# Patient Record
Sex: FEMALE
[De-identification: names, ages, dates, MRNs, and addresses within clinical notes are randomized per-mention and may not be internally consistent; named-entity substitution may affect disease eponyms.]

---

## 2020-11-14 ENCOUNTER — NURSE TRIAGE (OUTPATIENT)
Dept: OTHER | Facility: CLINIC | Age: 65
End: 2020-11-14

## 2020-11-14 NOTE — TELEPHONE ENCOUNTER
Reason for Disposition   [1] Fever > 101 F (38.3 C) AND [2] age > 61    Answer Assessment - Initial Assessment Questions  1. COVID-19 DIAGNOSIS: \"Who made your Coronavirus (COVID-19) diagnosis? \" \"Was it confirmed by a positive lab test?\" If not diagnosed by a HCP, ask \"Are there lots of cases (community spread) where you live? \" (See public health department website, if unsure)        [facility]    2. ONSET: \"When did the COVID-19 symptoms start? \"       Thursday    3. WORST SYMPTOM: \"What is your worst symptom? \" (e.g., cough, fever, shortness of breath, muscle aches)      Headache    4. COUGH: \"Do you have a cough? \" If so, ask: \"How bad is the cough? \"        Dry cough and intermittent    5. FEVER: \"Do you have a fever? \" If so, ask: \"What is your temperature, how was it measured, and when did it start? \"      101.2,     6. RESPIRATORY STATUS: \"Describe your breathing? \" (e.g., shortness of breath, wheezing, unable to speak)        None    7. BETTER-SAME-WORSE: Lena Cordial you getting better, staying the same or getting worse compared to yesterday? \"  If getting worse, ask, \"In what way? \"      Same    8. HIGH RISK DISEASE: \"Do you have any chronic medical problems? \" (e.g., asthma, heart or lung disease, weak immune system, etc.)      Hypertension, Obese    10. OTHER SYMPTOMS: \"Do you have any other symptoms? \"  (e.g., chills, fatigue, headache, loss of smell or taste, muscle pain, sore throat)        Headache, fatigue, muscle pain, chills, sore throat    Caller stated she is experiencing some symptoms of a fever of 101. Since Thursday caller developed a dry cough and aches. Caller said she was exposed to someone who tested positive. Caller was informed to follow up with her PCP and to call back for additional questions or if her symptoms worsened and agreed.     Protocols used: CORONAVIRUS (COVID-19) DIAGNOSED OR SUSPECTED-ADULT-

## 2023-03-31 ENCOUNTER — TELEPHONE (OUTPATIENT)
Dept: PRIMARY CARE | Facility: CLINIC | Age: 68
End: 2023-03-31

## 2023-03-31 NOTE — TELEPHONE ENCOUNTER
Pt is calling to discuss some stiches on her hand that she thinks need to come out. She is looking for advise Please call.

## 2023-04-01 PROBLEM — M25.562 KNEE PAIN, BILATERAL: Status: ACTIVE | Noted: 2023-04-01

## 2023-04-01 PROBLEM — M79.605 PAIN IN BOTH LOWER EXTREMITIES: Status: ACTIVE | Noted: 2023-04-01

## 2023-04-01 PROBLEM — M25.672 JOINT STIFFNESS OF BOTH ANKLES: Status: ACTIVE | Noted: 2023-04-01

## 2023-04-01 PROBLEM — M70.30 BURSITIS OF ELBOW: Status: ACTIVE | Noted: 2023-04-01

## 2023-04-01 PROBLEM — M25.571 BILATERAL ANKLE PAIN: Status: ACTIVE | Noted: 2023-04-01

## 2023-04-01 PROBLEM — M19.071 ARTHRITIS OF BOTH ANKLES: Status: ACTIVE | Noted: 2023-04-01

## 2023-04-01 PROBLEM — E03.9 HYPOTHYROIDISM, ADULT: Status: ACTIVE | Noted: 2023-04-01

## 2023-04-01 PROBLEM — U07.1 DISEASE DUE TO SEVERE ACUTE RESPIRATORY SYNDROME CORONAVIRUS 2 (SARS-COV-2): Status: ACTIVE | Noted: 2023-04-01

## 2023-04-01 PROBLEM — M79.604 PAIN IN BOTH LOWER EXTREMITIES: Status: ACTIVE | Noted: 2023-04-01

## 2023-04-01 PROBLEM — I89.0 LYMPHEDEMA OF BOTH LOWER EXTREMITIES: Status: ACTIVE | Noted: 2023-04-01

## 2023-04-01 PROBLEM — M25.561 KNEE PAIN, BILATERAL: Status: ACTIVE | Noted: 2023-04-01

## 2023-04-01 PROBLEM — M10.9 GOUT: Status: ACTIVE | Noted: 2023-04-01

## 2023-04-01 PROBLEM — M17.0 ARTHRITIS OF BOTH KNEES: Status: ACTIVE | Noted: 2023-04-01

## 2023-04-01 PROBLEM — M25.572 BILATERAL ANKLE PAIN: Status: ACTIVE | Noted: 2023-04-01

## 2023-04-01 PROBLEM — M25.671 JOINT STIFFNESS OF BOTH ANKLES: Status: ACTIVE | Noted: 2023-04-01

## 2023-04-01 PROBLEM — M62.81 GENERALIZED MUSCLE WEAKNESS: Status: ACTIVE | Noted: 2023-04-01

## 2023-04-01 PROBLEM — M19.072 ARTHRITIS OF BOTH ANKLES: Status: ACTIVE | Noted: 2023-04-01

## 2023-04-01 PROBLEM — E86.0 DEHYDRATION: Status: ACTIVE | Noted: 2023-04-01

## 2023-04-01 RX ORDER — DOCUSATE SODIUM 100 MG/1
100 CAPSULE, LIQUID FILLED ORAL DAILY
COMMUNITY

## 2023-04-01 RX ORDER — TRIAMCINOLONE ACETONIDE OINTMENT USP, 0.05% 0.5 MG/G
OINTMENT TOPICAL
COMMUNITY

## 2023-04-01 RX ORDER — FUROSEMIDE 40 MG/1
1 TABLET ORAL DAILY
COMMUNITY
End: 2023-11-02 | Stop reason: SDUPTHER

## 2023-04-01 RX ORDER — TAZAROTENE 1 MG/G
CREAM TOPICAL
COMMUNITY

## 2023-04-01 RX ORDER — LOSARTAN POTASSIUM AND HYDROCHLOROTHIAZIDE 12.5; 1 MG/1; MG/1
1 TABLET ORAL DAILY
COMMUNITY
End: 2023-11-02 | Stop reason: SDUPTHER

## 2023-04-01 RX ORDER — ASPIRIN 81 MG/1
TABLET ORAL
COMMUNITY

## 2023-04-01 RX ORDER — SIMVASTATIN 40 MG/1
1 TABLET, FILM COATED ORAL NIGHTLY
COMMUNITY
End: 2024-01-25 | Stop reason: SDUPTHER

## 2023-04-01 RX ORDER — LEVOTHYROXINE SODIUM 100 UG/1
1 TABLET ORAL DAILY
COMMUNITY
End: 2023-08-30

## 2023-04-01 RX ORDER — PENTOXIFYLLINE 400 MG/1
TABLET, EXTENDED RELEASE ORAL
COMMUNITY

## 2023-04-03 ENCOUNTER — OFFICE VISIT (OUTPATIENT)
Dept: PRIMARY CARE | Facility: CLINIC | Age: 68
End: 2023-04-03
Payer: COMMERCIAL

## 2023-04-03 VITALS
HEART RATE: 78 BPM | DIASTOLIC BLOOD PRESSURE: 84 MMHG | WEIGHT: 268 LBS | TEMPERATURE: 97.9 F | BODY MASS INDEX: 39.69 KG/M2 | HEIGHT: 69 IN | SYSTOLIC BLOOD PRESSURE: 136 MMHG | OXYGEN SATURATION: 98 % | RESPIRATION RATE: 16 BRPM

## 2023-04-03 DIAGNOSIS — S61.011D LACERATION OF RIGHT THUMB WITHOUT FOREIGN BODY WITHOUT DAMAGE TO NAIL, SUBSEQUENT ENCOUNTER: Primary | ICD-10-CM

## 2023-04-03 PROCEDURE — 1159F MED LIST DOCD IN RCRD: CPT | Performed by: FAMILY MEDICINE

## 2023-04-03 PROCEDURE — 1036F TOBACCO NON-USER: CPT | Performed by: FAMILY MEDICINE

## 2023-04-05 NOTE — PROGRESS NOTES
"Patient ID: Vielka Geiger is a 67 y.o. female.    ProceduresSubjective   Vielka Geiger is a 67 y.o. female who obtained a laceration 10 days ago, which required closure with 1 suture. Mechanism of injury: cut finger with knife . She denies pain, redness, or drainage from the wound. Her last tetanus was 10 days ago.    Objective   /84 (BP Location: Left arm)   Pulse 78   Temp 36.6 °C (97.9 °F)   Resp 16   Ht 1.74 m (5' 8.5\")   Wt 122 kg (268 lb)   SpO2 98%   BMI 40.16 kg/m²   Injury exam:  A 3 cm laceration noted on the thumb is healing well, without evidence of infection.    Assessment/Plan   Laceration is healing well, without evidence of infection.    1. 1 suture were removed.  2. Wound care discussed.  3. Follow up as needed.  "

## 2023-08-22 PROBLEM — E04.1 NONTOXIC UNINODULAR GOITER: Status: ACTIVE | Noted: 2023-08-22

## 2023-08-22 PROBLEM — M25.672 JOINT STIFFNESS OF BOTH ANKLES: Status: RESOLVED | Noted: 2023-04-01 | Resolved: 2023-08-22

## 2023-08-22 PROBLEM — M25.562 KNEE PAIN, BILATERAL: Status: RESOLVED | Noted: 2023-04-01 | Resolved: 2023-08-22

## 2023-08-22 PROBLEM — M25.671 JOINT STIFFNESS OF BOTH ANKLES: Status: RESOLVED | Noted: 2023-04-01 | Resolved: 2023-08-22

## 2023-08-22 PROBLEM — E86.0 DEHYDRATION: Status: RESOLVED | Noted: 2023-04-01 | Resolved: 2023-08-22

## 2023-08-22 PROBLEM — M79.605 PAIN IN BOTH LOWER EXTREMITIES: Status: RESOLVED | Noted: 2023-04-01 | Resolved: 2023-08-22

## 2023-08-22 PROBLEM — G51.0 BELL'S PALSY: Status: ACTIVE | Noted: 2023-08-22

## 2023-08-22 PROBLEM — I10 ESSENTIAL HYPERTENSION: Status: ACTIVE | Noted: 2023-08-22

## 2023-08-22 PROBLEM — E78.2 MIXED HYPERLIPIDEMIA: Status: ACTIVE | Noted: 2023-08-22

## 2023-08-22 PROBLEM — M25.561 KNEE PAIN, BILATERAL: Status: RESOLVED | Noted: 2023-04-01 | Resolved: 2023-08-22

## 2023-08-22 PROBLEM — M79.604 PAIN IN BOTH LOWER EXTREMITIES: Status: RESOLVED | Noted: 2023-04-01 | Resolved: 2023-08-22

## 2023-08-22 RX ORDER — NAFTIFINE HYDROCHLORIDE 20 MG/G
CREAM TOPICAL
COMMUNITY
Start: 2016-11-09

## 2023-08-22 RX ORDER — SODIUM SULFACETAMIDE AND SULFUR 80; 40 MG/ML; MG/ML
1 SOLUTION TOPICAL
COMMUNITY
Start: 2016-07-14

## 2023-08-22 RX ORDER — LOSARTAN POTASSIUM 100 MG/1
100 TABLET ORAL DAILY
COMMUNITY
End: 2023-12-11 | Stop reason: SDUPTHER

## 2023-08-22 RX ORDER — EFINACONAZOLE 100 MG/ML
SOLUTION TOPICAL
COMMUNITY
Start: 2016-05-05

## 2023-08-30 ENCOUNTER — APPOINTMENT (OUTPATIENT)
Dept: PRIMARY CARE | Facility: CLINIC | Age: 68
End: 2023-08-30
Payer: MEDICARE

## 2023-08-30 DIAGNOSIS — E04.1 NONTOXIC UNINODULAR GOITER: Primary | ICD-10-CM

## 2023-08-30 RX ORDER — LEVOTHYROXINE SODIUM 100 UG/1
100 TABLET ORAL DAILY
OUTPATIENT
Start: 2023-08-30

## 2023-08-30 RX ORDER — LEVOTHYROXINE SODIUM 100 UG/1
100 TABLET ORAL
Qty: 90 TABLET | Refills: 3 | Status: SHIPPED | OUTPATIENT
Start: 2023-08-30 | End: 2023-08-31 | Stop reason: SDUPTHER

## 2023-08-30 RX ORDER — LEVOTHYROXINE SODIUM 100 UG/1
100 TABLET ORAL
COMMUNITY
End: 2023-08-30 | Stop reason: SDUPTHER

## 2023-08-30 NOTE — TELEPHONE ENCOUNTER
Per pt, Synthroid 100 mcg no longer will be covered by her insurance unless it's coded as GILMAR 5. Please send to new mail in - [facility].

## 2023-08-31 DIAGNOSIS — E04.1 NONTOXIC UNINODULAR GOITER: ICD-10-CM

## 2023-08-31 RX ORDER — LEVOTHYROXINE SODIUM 100 UG/1
100 TABLET ORAL
Qty: 90 TABLET | Refills: 3 | Status: SHIPPED | OUTPATIENT
Start: 2023-08-31 | End: 2023-09-08 | Stop reason: SDUPTHER

## 2023-09-07 DIAGNOSIS — E04.1 NONTOXIC UNINODULAR GOITER: ICD-10-CM

## 2023-09-07 NOTE — TELEPHONE ENCOUNTER
Per pt, she found a new program that sends Synthroid directly from the . [facility] will be calling or faxing us a form in the next several days.

## 2023-09-07 NOTE — TELEPHONE ENCOUNTER
Per pt, please fax Synthroid 100 mcg 90 tablets, 90 days script to [facility] mail order  Fax [phone]   Tel [phone]

## 2023-09-10 RX ORDER — LEVOTHYROXINE SODIUM 100 UG/1
100 TABLET ORAL
Qty: 90 TABLET | Refills: 3 | Status: SHIPPED | OUTPATIENT
Start: 2023-09-10

## 2023-10-02 ENCOUNTER — TREATMENT (OUTPATIENT)
Dept: PHYSICAL THERAPY | Facility: CLINIC | Age: 68
End: 2023-10-02
Payer: MEDICARE

## 2023-10-02 DIAGNOSIS — M25.572 BILATERAL ANKLE PAIN, UNSPECIFIED CHRONICITY: Primary | ICD-10-CM

## 2023-10-02 DIAGNOSIS — M25.571 BILATERAL ANKLE PAIN, UNSPECIFIED CHRONICITY: Primary | ICD-10-CM

## 2023-10-02 DIAGNOSIS — M25.561 KNEE PAIN, BILATERAL: ICD-10-CM

## 2023-10-02 DIAGNOSIS — M19.072 ARTHRITIS OF BOTH ANKLES: ICD-10-CM

## 2023-10-02 DIAGNOSIS — M17.0 ARTHRITIS OF BOTH KNEES: ICD-10-CM

## 2023-10-02 DIAGNOSIS — M19.071 ARTHRITIS OF BOTH ANKLES: ICD-10-CM

## 2023-10-02 DIAGNOSIS — M25.562 KNEE PAIN, BILATERAL: ICD-10-CM

## 2023-10-02 PROCEDURE — 97140 MANUAL THERAPY 1/> REGIONS: CPT | Mod: GP

## 2023-10-02 PROCEDURE — 97110 THERAPEUTIC EXERCISES: CPT | Mod: GP

## 2023-10-02 PROCEDURE — 97535 SELF CARE MNGMENT TRAINING: CPT | Mod: GP

## 2023-10-02 ASSESSMENT — ENCOUNTER SYMPTOMS
DEPRESSION: 0
LOSS OF SENSATION IN FEET: 0
OCCASIONAL FEELINGS OF UNSTEADINESS: 1

## 2023-10-02 NOTE — PROGRESS NOTES
Physical Therapy    Physical Therapy Treatment    Patient Name: Vielka Geiger  MRN: [id number]  Today's Date: 10/2/2023  Time Calculation  Start Time: 1050  Stop Time: 1145  Time Calculation (min): 55 min      Assessment:   Pt continues to tolerate tx well & is reporting decreased LE pain afterwards.  Her low back has been a little more aggravated, but this is likely just adjusting to the newer activity levels because the LEs are not as limiting & should begin to settle as her back tolerates more activity.  Overall the pt still has limited activity tolerance d/t back, & B LE pain, but anticipate pt. will see continued progress with regular HEP adherence.  However, she would still benefit from regular f/u's to monitor their progress & ensure appropriate progressions.     Plan:  OP PT Plan  Treatment/Interventions: Dry needling, Education/ Instruction, Electrical stimulation, Gait training, Manual therapy, Neuromuscular re-education, Self care/ home management, Therapeutic activities, Therapeutic exercises  PT Plan: Skilled PT  PT Frequency: 1 time per week  Certification Period Start Date: 09/28/23  Certification Period End Date: 12/28/23  Number of Treatments Authorized: 8  Rehab Potential: Good  Plan of Care Agreement: Patient    Current Problem  1. Bilateral ankle pain, unspecified chronicity  Follow Up In Physical Therapy      2. Knee pain, bilateral  Follow Up In Physical Therapy      3. Arthritis of both ankles  Follow Up In Physical Therapy      4. Arthritis of both knees  Follow Up In Physical Therapy          Subjective   Pt reports her hips are feeling pretty good & her knees are a little sore, but her back is a little more aggravated.  Pt reports since the hips & LEs are feeling better this can cause the back to be a little more aggravated which likely contributed.      Objective     Observations: pt. arrived to therapy ambulating IND but with decreased stance time on the L LE. Pt. was wearing B  "compression stockings (20-30 mmHg). Mod. edema, skin discoloration, & hardening noted B (R > L). Hypertonicity & tenderness also noted in the gastroc/soleus, peroneal, ant./post tibialis mm. Pt. also c/o increased pain along the B (R > L) malleoli (lateral > medial). Pt. c/o her 'familiar' L knee pain along the ITB, most notably at the distal insertion.      Manual Therapy ([id number]): 24 minutes  Soft tissue mobilization: superficial effleurage, petrissage, cross-friction, & mod. pressure to the L TFL, gluteals, quadriceps, & ITB, & B gastroc/soleus mm.  Active mm. pumping to the L quadriceps mm.  Joint mobs: supine B TC distraction (grade IV)    Appropriate force, direction, amplitude, & velocity for selected techniques to improve joint & soft tissue mobility & enhance ADL performance. Rationale & procedure given for above treatment techniques, & verbal consent was obtained prior to initiating treatment.       Dry Needling ([id number]): 6  minutes  Location (Needle length x Dosage):  - B gluteus medius (3\" x 4)  Type: basic insertion with needles left in situ + e-stim for mm. relaxation in B glute med   Response: no adverse events observed or reported    Pt. educated on theory & practical application of dry needling, as well as potential risks & benefits of needling, & additional verbal consent obtained prior to initiation of needling. Standard precautions, knowledge of appropriate benefits of treatment, & exclusion of relevant contraindications utilized.        Therapeutic Exercises ([id number]): 13 minutes   NuStep (level 7) x 12 min    Verbally reviewed but not actively performed today:   Walking x 200'  B LE calf raises  Walking program  Standing gastroc & soleus stretch  Hook-lying hip ABD iso's into belt  Hook-lying bridge  Firm / SLS / Hip ABD (each)    * = added to HEP. Sheet with exercise descriptions and images issued. Skilled intervention utilized in the appropriate selection & application of above exercises. Verbal " and tactile cues provided for proper form and technique. Pt. demonstrated appropriate form & verbalized understanding of optimal technique for above exercises.     TA = transverse abdominis activation  PPT = posterior pelvic tilt      Self Care / Home Management ([id number]): 12 minutes     Reviewed & updated current HEP:  B LE calf raises  Standing gastroc & soleus stretch  Firm / SLS / Hip ABD (each)  Hook-lying hip ABD iso's into belt  Walking program    Large portion of pt. care time devoted to reviewing pt's current symptoms & determining optimal manual techniques with appropriate HEP modifications. Reviewed importance of regular HEP adherence, & stressed importance of proper form. Reviewed differences between post-exercises soreness vs. increased 'familiar’ pain, & reviewed appropriate progressions/regressions with exercises/activities based on symptoms. Pt. verbalized understanding & agreement.

## 2023-10-11 ENCOUNTER — TREATMENT (OUTPATIENT)
Dept: PHYSICAL THERAPY | Facility: CLINIC | Age: 68
End: 2023-10-11
Payer: MEDICARE

## 2023-10-11 DIAGNOSIS — M25.571 CHRONIC PAIN OF BOTH ANKLES: ICD-10-CM

## 2023-10-11 DIAGNOSIS — M25.572 CHRONIC PAIN OF BOTH ANKLES: ICD-10-CM

## 2023-10-11 DIAGNOSIS — G89.29 CHRONIC PAIN OF BOTH ANKLES: ICD-10-CM

## 2023-10-11 DIAGNOSIS — M25.562 CHRONIC PAIN OF BOTH KNEES: Primary | ICD-10-CM

## 2023-10-11 DIAGNOSIS — M19.072 ARTHRITIS OF BOTH ANKLES: ICD-10-CM

## 2023-10-11 DIAGNOSIS — G89.29 CHRONIC PAIN OF BOTH KNEES: Primary | ICD-10-CM

## 2023-10-11 DIAGNOSIS — M25.561 CHRONIC PAIN OF BOTH KNEES: Primary | ICD-10-CM

## 2023-10-11 DIAGNOSIS — M17.0 ARTHRITIS OF BOTH KNEES: ICD-10-CM

## 2023-10-11 DIAGNOSIS — M19.071 ARTHRITIS OF BOTH ANKLES: ICD-10-CM

## 2023-10-11 PROCEDURE — 97140 MANUAL THERAPY 1/> REGIONS: CPT | Mod: GP

## 2023-10-11 PROCEDURE — 97110 THERAPEUTIC EXERCISES: CPT | Mod: GP

## 2023-10-11 PROCEDURE — 97535 SELF CARE MNGMENT TRAINING: CPT | Mod: GP

## 2023-10-11 ASSESSMENT — ENCOUNTER SYMPTOMS
DEPRESSION: 0
OCCASIONAL FEELINGS OF UNSTEADINESS: 1
LOSS OF SENSATION IN FEET: 0

## 2023-10-11 ASSESSMENT — PATIENT HEALTH QUESTIONNAIRE - PHQ9
1. LITTLE INTEREST OR PLEASURE IN DOING THINGS: NOT AT ALL
2. FEELING DOWN, DEPRESSED OR HOPELESS: NOT AT ALL
SUM OF ALL RESPONSES TO PHQ9 QUESTIONS 1 AND 2: 0

## 2023-10-11 NOTE — PROGRESS NOTES
Physical Therapy    Physical Therapy Treatment    Patient Name: Vielka Geiger  MRN: [id number]  Today's Date: 10/11/2023  Time Calculation  Start Time: 1403  Insurance   Visit number: 12 (2 of 8)   30 vs/yr   Medicare Certification Period: Beginnin2023 Endin2023      Assessment:  Pt continues to see slow, but steady improvements in her B LE pain.  She still fatigues quickly & reports feeling less confident on her feet when tired, but anticipate these will continue to improve with regular HEP adherence & a progressive return to activity.  However, she would still benefit from regular f/u's to monitor their progress & ensure appropriate progressions.     Plan:  OP PT Plan  Treatment/Interventions: Dry needling, Education/ Instruction, Electrical stimulation, Gait training, Manual therapy, Neuromuscular re-education, Self care/ home management, Therapeutic activities, Therapeutic exercises  PT Plan: Skilled PT  PT Frequency: 1 time per week  Certification Period Start Date: 23  Certification Period End Date: 23  Number of Treatments Authorized: 8  Rehab Potential: Good  Plan of Care Agreement: Patient    Current Problem  1. Chronic pain of both knees        2. Chronic pain of both ankles        3. Arthritis of both knees        4. Arthritis of both ankles              Subjective   Pt reports the DN really helped when going to see the jeff - states the car ride wasn't as bad & she was much more mobile getting up/down, etc.  Pt states her L knee got a little more aggravated over the past few days because she was 'pushing it a lot'.  Currently she reports the ankles still aren't as strong as she wants them to be & by mid day they feel 'weak'.  The low back also still hurts if she's on her feet or sitting for too long.       Objective     Observations:  pt. arrived to therapy ambulating IND but with decreased stance time on the L LE. Pt. was wearing B compression stockings (20-30  "mmHg). Mod. edema, skin discoloration, & hardening noted B (R > L). Hypertonicity & tenderness also noted in the gastroc/soleus, peroneal, ant./post tibialis mm. Pt. also c/o increased pain along the B (R > L) malleoli (lateral > medial). Pt. c/o her 'familiar' L knee pain along the ITB, most notably at the distal insertion.      Manual Therapy ([id number]):  15 minutes  Soft tissue mobilization: superficial effleurage, petrissage, cross-friction, & mod. pressure to the L TFL, gluteals, quadriceps, & ITB, & B gastroc/soleus mm.  Active mm. pumping to the L quadriceps mm.  Joint mobs: supine B TC distraction (grade IV)    Appropriate force, direction, amplitude, & velocity for selected techniques to improve joint & soft tissue mobility & enhance ADL performance. Rationale & procedure given for above treatment techniques, & verbal consent was obtained prior to initiating treatment.       Dry Needling ([id number]):  6 minutes  Location (Needle length x Dosage):  - B gluteus medius (3\" x 4)  - L ITB (2\" x 3)  Type: basic insertion with needles left in situ + e-stim for mm. relaxation in B glute med   Response: no adverse events observed or reported    Pt. educated on theory & practical application of dry needling, as well as potential risks & benefits of needling, & additional verbal consent obtained prior to initiation of needling. Standard precautions, knowledge of appropriate benefits of treatment, & exclusion of relevant contraindications utilized.        Therapeutic Exercises ([id number]):  14 minutes   NuStep (level 5) x 12:30 min    Verbally reviewed but not actively performed today:   Walking x 200'  B LE calf raises  Walking program  Standing gastroc & soleus stretch  Hook-lying hip ABD iso's into belt  Hook-lying bridge  Firm / SLS / Hip ABD (each)    * = added to HEP. Sheet with exercise descriptions and images issued. Skilled intervention utilized in the appropriate selection & application of above exercises. Verbal and " tactile cues provided for proper form and technique. Pt. demonstrated appropriate form & verbalized understanding of optimal technique for above exercises.     TA = transverse abdominis activation  PPT = posterior pelvic tilt      Self Care / Home Management ([id number]): 14 minutes     Reviewed & updated current HEP:  B LE calf raises  Standing gastroc & soleus stretch  Firm / SLS / Hip ABD (each)  Hook-lying hip ABD iso's into belt  Walking program    Large portion of pt. care time devoted to reviewing pt's current symptoms & determining optimal manual techniques with appropriate HEP modifications. Reviewed importance of regular HEP adherence, & stressed importance of proper form. Reviewed differences between post-exercises soreness vs. increased 'familiar’ pain, & reviewed appropriate progressions/regressions with exercises/activities based on symptoms. Pt. verbalized understanding & agreement.

## 2023-10-18 ENCOUNTER — APPOINTMENT (OUTPATIENT)
Dept: PHYSICAL THERAPY | Facility: CLINIC | Age: 68
End: 2023-10-18
Payer: MEDICARE

## 2023-10-25 ENCOUNTER — TREATMENT (OUTPATIENT)
Dept: PHYSICAL THERAPY | Facility: CLINIC | Age: 68
End: 2023-10-25
Payer: MEDICARE

## 2023-10-25 DIAGNOSIS — M17.0 ARTHRITIS OF BOTH KNEES: ICD-10-CM

## 2023-10-25 DIAGNOSIS — M19.071 ARTHRITIS OF BOTH ANKLES: ICD-10-CM

## 2023-10-25 DIAGNOSIS — G89.29 CHRONIC PAIN OF BOTH KNEES: Primary | ICD-10-CM

## 2023-10-25 DIAGNOSIS — M25.571 CHRONIC PAIN OF BOTH ANKLES: ICD-10-CM

## 2023-10-25 DIAGNOSIS — G89.29 CHRONIC PAIN OF BOTH ANKLES: ICD-10-CM

## 2023-10-25 DIAGNOSIS — M25.562 CHRONIC PAIN OF BOTH KNEES: Primary | ICD-10-CM

## 2023-10-25 DIAGNOSIS — M25.561 CHRONIC PAIN OF BOTH KNEES: Primary | ICD-10-CM

## 2023-10-25 DIAGNOSIS — M19.072 ARTHRITIS OF BOTH ANKLES: ICD-10-CM

## 2023-10-25 DIAGNOSIS — M25.572 CHRONIC PAIN OF BOTH ANKLES: ICD-10-CM

## 2023-10-25 PROCEDURE — 97140 MANUAL THERAPY 1/> REGIONS: CPT | Mod: GP

## 2023-10-25 NOTE — PROGRESS NOTES
Physical Therapy    Physical Therapy Treatment    Patient Name: Vielka Geiger  MRN: [id number]  Today's Date: 10/25/2023  Time Calculation  Start Time: 1409    Insurance   Visit number: 13 (3 of 8)   30 vs/yr   Medicare Certification Period: Beginnin2023 Endin2023      Assessment:  Pt progress has been slow, likely d/t excessive activity coupled with managing family stress.  Anticipate pt. will see improved progress with regular HEP adherence, but they would still benefit from regular f/u's to monitor their progress & ensure appropriate progressions.     Plan:  OP PT Plan  Treatment/Interventions: Dry needling, Education/ Instruction, Electrical stimulation, Gait training, Manual therapy, Neuromuscular re-education, Self care/ home management, Therapeutic activities, Therapeutic exercises  PT Plan: Skilled PT  PT Frequency: 1 time per week  Certification Period Start Date: 23  Certification Period End Date: 23  Number of Treatments Authorized: 8  Rehab Potential: Good  Plan of Care Agreement: Patient    Current Problem  1. Chronic pain of both knees        2. Chronic pain of both ankles        3. Arthritis of both knees        4. Arthritis of both ankles              Subjective   Pt reports she's having a lot of stress dealing with family medical conditions & work-related stress.   Pt reports she had to cancel her last appt because she wasn't feeling well, but also reported her B feet were having significantly increased pain.  Pt denies any changes in footwear, activity, or other potential cause of the increased pain.  States she has so much pain she had to work from home because she was having so much pain with WB.  States her foot symptoms have generally returned to baseline since.  Her hips had been feeling a little worse prior to the foot pain, but since then she's been having more B hip & back pain d/t compensating for her feet.      Objective     Observations:  pt. arrived  "to therapy ambulating IND but with decreased stance time on the L LE. Pt. was wearing B compression stockings (20-30 mmHg). Mod. edema, skin discoloration, & hardening noted B (R > L). Hypertonicity & tenderness also noted in the gastroc/soleus, peroneal, ant./post tibialis mm. Pt. also c/o increased pain along the B (R > L) malleoli (lateral > medial). Pt. c/o her 'familiar' L knee pain along the ITB, most notably at the distal insertion.      Manual Therapy ([id number]):  24 minutes  Soft tissue mobilization: superficial effleurage, petrissage, cross-friction, & mod. pressure to the L TFL, gluteals, quadriceps, & ITB, & B gastroc/soleus mm.  Active mm. pumping to the L quadriceps mm.  Joint mobs: supine B TC distraction (grade IV)    Appropriate force, direction, amplitude, & velocity for selected techniques to improve joint & soft tissue mobility & enhance ADL performance. Rationale & procedure given for above treatment techniques, & verbal consent was obtained prior to initiating treatment.       Dry Needling (27034):  6 minutes  Location (Needle length x Dosage):  - B gluteus medius (3\" x 4)  - L ITB (2\" x 3)  Type: basic insertion with needles left in situ + e-stim for mm. relaxation in B glute med   Response: no adverse events observed or reported    Pt. educated on theory & practical application of dry needling, as well as potential risks & benefits of needling, & additional verbal consent obtained prior to initiation of needling. Standard precautions, knowledge of appropriate benefits of treatment, & exclusion of relevant contraindications utilized.        Therapeutic Exercises ([id number]):  not performed today  NuStep (level 5) x 12:30 min    Verbally reviewed but not actively performed today:   Walking x 200'  B LE calf raises  Walking program  Standing gastroc & soleus stretch  Hook-lying hip ABD iso's into belt  Hook-lying bridge  Firm / SLS / Hip ABD (each)    * = added to HEP. Sheet with exercise descriptions " and images issued. Skilled intervention utilized in the appropriate selection & application of above exercises. Verbal and tactile cues provided for proper form and technique. Pt. demonstrated appropriate form & verbalized understanding of optimal technique for above exercises.     TA = transverse abdominis activation  PPT = posterior pelvic tilt      Self Care / Home Management ([id number]): not performed today    Reviewed & updated current HEP:  B LE calf raises  Standing gastroc & soleus stretch  Firm / SLS / Hip ABD (each)  Hook-lying hip ABD iso's into belt  Walking program    Large portion of pt. care time devoted to reviewing pt's current symptoms & determining optimal manual techniques with appropriate HEP modifications. Reviewed importance of regular HEP adherence, & stressed importance of proper form. Reviewed differences between post-exercises soreness vs. increased 'familiar’ pain, & reviewed appropriate progressions/regressions with exercises/activities based on symptoms. Discussed potential to attempt some aquatic therapy, but pt is resistant d/t vasomotor rhinitis.  Also discussed potential to obtain pedometer (i.e. FitBit, Apple watch, etc.) to better monitor total activity throughout the day.  Pt. verbalized understanding & agreement.

## 2023-11-01 ENCOUNTER — TREATMENT (OUTPATIENT)
Dept: PHYSICAL THERAPY | Facility: CLINIC | Age: 68
End: 2023-11-01
Payer: MEDICARE

## 2023-11-01 DIAGNOSIS — G89.29 CHRONIC PAIN OF BOTH ANKLES: ICD-10-CM

## 2023-11-01 DIAGNOSIS — M19.071 ARTHRITIS OF BOTH ANKLES: ICD-10-CM

## 2023-11-01 DIAGNOSIS — M17.0 ARTHRITIS OF BOTH KNEES: ICD-10-CM

## 2023-11-01 DIAGNOSIS — M19.072 ARTHRITIS OF BOTH ANKLES: ICD-10-CM

## 2023-11-01 DIAGNOSIS — M25.561 CHRONIC PAIN OF BOTH KNEES: Primary | ICD-10-CM

## 2023-11-01 DIAGNOSIS — M25.562 CHRONIC PAIN OF BOTH KNEES: Primary | ICD-10-CM

## 2023-11-01 DIAGNOSIS — G89.29 CHRONIC PAIN OF BOTH KNEES: Primary | ICD-10-CM

## 2023-11-01 DIAGNOSIS — M25.572 CHRONIC PAIN OF BOTH ANKLES: ICD-10-CM

## 2023-11-01 DIAGNOSIS — M25.571 CHRONIC PAIN OF BOTH ANKLES: ICD-10-CM

## 2023-11-01 PROCEDURE — 97140 MANUAL THERAPY 1/> REGIONS: CPT | Mod: GP

## 2023-11-01 PROCEDURE — 97110 THERAPEUTIC EXERCISES: CPT | Mod: GP

## 2023-11-01 PROCEDURE — 97535 SELF CARE MNGMENT TRAINING: CPT | Mod: GP

## 2023-11-01 NOTE — PROGRESS NOTES
Physical Therapy    Physical Therapy Treatment    Patient Name: Vielka Geiger  MRN: [id number]  Today's Date: 2023  Time Calculation  Start Time: 1402    Insurance   Visit number: 14 (4 of 8)   30 vs/yr   Medicare Certification Period: Beginnin2023 Endin2023      Assessment:  Pt's progress continues to be slow as she continues to deal with multiple family stressors.  Pt had not been completing her ankle stretches as prescribed which likely contributed her ankles feeling like they're locking up.  She tolerated tx well & reported decreased pain afterwards.  Anticipate pt. will see improved progress with regular HEP adherence, but they would still benefit from regular f/u's to monitor their progress & ensure appropriate progressions.     Plan:  OP PT Plan  Treatment/Interventions: Dry needling, Education/ Instruction, Electrical stimulation, Gait training, Manual therapy, Neuromuscular re-education, Self care/ home management, Therapeutic activities, Therapeutic exercises  PT Plan: Skilled PT  PT Frequency: 1 time per week  Certification Period Start Date: 23  Certification Period End Date: 23  Number of Treatments Authorized: 8  Rehab Potential: Good  Plan of Care Agreement: Patient      Current Problem  1. Chronic pain of both knees        2. Chronic pain of both ankles        3. Arthritis of both knees        4. Arthritis of both ankles              Subjective   Pt reports her pain has been better & has been trying to walk more.  She hasn't been able to utilize her FitBit because she needs a new one, but is planning to obtain one.  She's had some issues with taking care of her family which has caused some stress.  She still c/o her ankle 'giving out' with walking because they feel weak.        Objective     Observations:  pt. arrived to therapy ambulating IND but with decreased stance time on the L LE. Pt. was wearing B compression stockings (20-30 mmHg). Mod. edema, skin  "discoloration, & hardening noted B (R > L). Hypertonicity & tenderness also noted in the gastroc/soleus, peroneal, ant./post tibialis mm. Pt. also c/o increased pain along the B (R > L) malleoli (lateral > medial). Pt. c/o her 'familiar' L knee pain along the ITB, most notably at the distal insertion.      Manual Therapy ([id number]):  17 minutes  Soft tissue mobilization: superficial effleurage, petrissage, cross-friction, & mod. pressure to the L TFL, gluteals, quadriceps, & ITB, & B gastroc/soleus mm.  Active mm. pumping to the L quadriceps mm.  Joint mobs: supine B TC distraction (grade IV)    Appropriate force, direction, amplitude, & velocity for selected techniques to improve joint & soft tissue mobility & enhance ADL performance. Rationale & procedure given for above treatment techniques, & verbal consent was obtained prior to initiating treatment.       Dry Needling ([id number]):  6 minutes  Location (Needle length x Dosage):  - B gluteus medius (3\" x 4)  - L ITB (2\" x 3)  Type: basic insertion with needles left in situ + e-stim for mm. relaxation in B glute med   Response: no adverse events observed or reported    Pt. educated on theory & practical application of dry needling, as well as potential risks & benefits of needling, & additional verbal consent obtained prior to initiation of needling. Standard precautions, knowledge of appropriate benefits of treatment, & exclusion of relevant contraindications utilized.        Therapeutic Exercises ([id number]):  14 minutes  NuStep (level 6) x 12 min    Verbally reviewed but not actively performed today:   Walking x 200'  B LE calf raises  Walking program  Standing gastroc & soleus stretch  Hook-lying hip ABD iso's into belt  Hook-lying bridge  Firm / SLS / Hip ABD (each)    * = added to HEP. Sheet with exercise descriptions and images issued. Skilled intervention utilized in the appropriate selection & application of above exercises. Verbal and tactile cues provided for " proper form and technique. Pt. demonstrated appropriate form & verbalized understanding of optimal technique for above exercises.     TA = transverse abdominis activation  PPT = posterior pelvic tilt      Self Care / Home Management (35317): 16 minutes    Reviewed & updated current HEP:  B LE calf raises  Standing gastroc & soleus stretch  Firm / SLS / Hip ABD (each)  Hook-lying hip ABD iso's into belt  Walking program    Large portion of pt. care time devoted to reviewing pt's current symptoms & determining optimal manual techniques with appropriate HEP modifications. Reviewed importance of regular HEP adherence, & stressed importance of proper form. Reviewed differences between post-exercises soreness vs. increased 'familiar’ pain, & reviewed appropriate progressions/regressions with exercises/activities based on symptoms. Discussed potential to attempt some aquatic therapy, but pt is resistant d/t vasomotor rhinitis.  Also discussed potential to obtain pedometer (i.e. FitBit, Apple watch, etc.) to better monitor total activity throughout the day.  Pt. verbalized understanding & agreement.

## 2023-11-02 DIAGNOSIS — I10 BENIGN ESSENTIAL HTN: Primary | ICD-10-CM

## 2023-11-02 RX ORDER — LOSARTAN POTASSIUM AND HYDROCHLOROTHIAZIDE 12.5; 1 MG/1; MG/1
1 TABLET ORAL DAILY
Qty: 90 TABLET | Refills: 1 | Status: SHIPPED | OUTPATIENT
Start: 2023-11-02 | End: 2024-01-31

## 2023-11-02 RX ORDER — FUROSEMIDE 40 MG/1
40 TABLET ORAL DAILY
Qty: 90 TABLET | Refills: 1 | Status: SHIPPED | OUTPATIENT
Start: 2023-11-02 | End: 2024-01-31

## 2023-11-09 ENCOUNTER — TREATMENT (OUTPATIENT)
Dept: PHYSICAL THERAPY | Facility: CLINIC | Age: 68
End: 2023-11-09
Payer: MEDICARE

## 2023-11-09 DIAGNOSIS — M25.572 CHRONIC PAIN OF BOTH ANKLES: ICD-10-CM

## 2023-11-09 DIAGNOSIS — M25.562 CHRONIC PAIN OF BOTH KNEES: Primary | ICD-10-CM

## 2023-11-09 DIAGNOSIS — M19.071 ARTHRITIS OF BOTH ANKLES: ICD-10-CM

## 2023-11-09 DIAGNOSIS — G89.29 CHRONIC PAIN OF BOTH ANKLES: ICD-10-CM

## 2023-11-09 DIAGNOSIS — M25.561 CHRONIC PAIN OF BOTH KNEES: Primary | ICD-10-CM

## 2023-11-09 DIAGNOSIS — M19.072 ARTHRITIS OF BOTH ANKLES: ICD-10-CM

## 2023-11-09 DIAGNOSIS — M17.0 ARTHRITIS OF BOTH KNEES: ICD-10-CM

## 2023-11-09 DIAGNOSIS — G89.29 CHRONIC PAIN OF BOTH KNEES: Primary | ICD-10-CM

## 2023-11-09 DIAGNOSIS — M25.571 CHRONIC PAIN OF BOTH ANKLES: ICD-10-CM

## 2023-11-09 PROCEDURE — 97110 THERAPEUTIC EXERCISES: CPT | Mod: GP

## 2023-11-09 PROCEDURE — 97535 SELF CARE MNGMENT TRAINING: CPT | Mod: GP

## 2023-11-09 PROCEDURE — 97140 MANUAL THERAPY 1/> REGIONS: CPT | Mod: GP

## 2023-11-09 NOTE — PROGRESS NOTES
Physical Therapy    Physical Therapy Treatment    Patient Name: Vielka Geiger  MRN: [id number]  Today's Date: 2023  Time Calculation  Start Time: 1450  Stop Time: 1530  Time Calculation (min): 40 min    Insurance   Visit number: 15 (5 of 8)   30 vs/yr   Medicare Certification Period: Beginnin2023 Endin2023      Assessment:  Pt's progress continues to be slow as she continues to deal with multiple family stressors & multiple areas of impairments.  She tolerated tx well & reported decreased pain afterwards.  Anticipate pt. will see improved progress with regular HEP adherence, but they would still benefit from regular f/u's to monitor their progress & ensure appropriate progressions.     Plan:  OP PT Plan  Treatment/Interventions: Dry needling, Education/ Instruction, Electrical stimulation, Gait training, Manual therapy, Neuromuscular re-education, Self care/ home management, Therapeutic activities, Therapeutic exercises  PT Plan: Skilled PT  PT Frequency: 1 time per week  Certification Period Start Date: 23  Certification Period End Date: 23  Number of Treatments Authorized: 8  Rehab Potential: Good  Plan of Care Agreement: Patient      Current Problem  1. Chronic pain of both knees        2. Chronic pain of both ankles        3. Arthritis of both knees        4. Arthritis of both ankles              Subjective   Pt reports her L foot is a little sore from from getting caught up in her sheets, but otherwise her ankles are feeling okay.  Pt reports her back was sore from sitting in a Drs office for a long time, but it tended to improve over the next couple days.   She reports keeping up with the stretching, but hasn't been doing the HEP as much as prescribed.  Pt. rates her hip pain as 4/10 & knee pain as 5/10.      Objective   Observations:  pt. arrived to therapy ambulating IND but with decreased stance time on the L LE. Pt. was wearing B compression stockings (20-30 mmHg).  "Mod. edema, skin discoloration, & hardening noted B (R > L). Hypertonicity & tenderness also noted in the gastroc/soleus, peroneal, ant./post tibialis mm. Pt. also c/o increased pain along the B (R > L) malleoli (lateral > medial). Pt. c/o her 'familiar' L knee pain along the ITB, most notably at the distal insertion.      Manual Therapy ([id number]):  12 minutes  Soft tissue mobilization: superficial effleurage, petrissage, cross-friction, & mod. pressure to the L TFL, gluteals, quadriceps, & ITB, & B gastroc/soleus mm.  Active mm. pumping to the L quadriceps mm.  Joint mobs: supine B TC distraction (grade IV)    Appropriate force, direction, amplitude, & velocity for selected techniques to improve joint & soft tissue mobility & enhance ADL performance. Rationale & procedure given for above treatment techniques, & verbal consent was obtained prior to initiating treatment.       Dry Needling ([id number]):  6 minutes  Location (Needle length x Dosage):  - B gluteus medius (3\" x 4)  - L ITB (2\" x 3)  Type: basic insertion with needles left in situ + e-stim for mm. relaxation in B glute med   Response: no adverse events observed or reported    Pt. educated on theory & practical application of dry needling, as well as potential risks & benefits of needling, & additional verbal consent obtained prior to initiation of needling. Standard precautions, knowledge of appropriate benefits of treatment, & exclusion of relevant contraindications utilized.        Therapeutic Exercises ([id number]):  12 minutes  Hook-lying hip ABD iso's into belt  Hook-lying bridge  NuStep (level 6) x 10 min    Verbally reviewed but not actively performed today:   Walking x 200'  B LE calf raises  Walking program  Standing gastroc & soleus stretch  Firm / SLS / Hip ABD (each)    * = added to HEP. Sheet with exercise descriptions and images issued. Skilled intervention utilized in the appropriate selection & application of above exercises. Verbal and tactile cues " provided for proper form and technique. Pt. demonstrated appropriate form & verbalized understanding of optimal technique for above exercises.     TA = transverse abdominis activation  PPT = posterior pelvic tilt      Self Care / Home Management ([id number]):  10 minutes    Reviewed & updated current HEP:  B LE calf raises  Standing gastroc & soleus stretch  Firm / SLS / Hip ABD (each)  Hook-lying hip ABD iso's into belt  Walking program    Large portion of pt. care time devoted to reviewing pt's current symptoms & determining optimal manual techniques with appropriate HEP modifications. Reviewed importance of regular HEP adherence, & stressed importance of proper form. Reviewed differences between post-exercises soreness vs. increased 'familiar’ pain, & reviewed appropriate progressions/regressions with exercises/activities based on symptoms. Discussed potential to attempt some aquatic therapy, but pt is resistant d/t vasomotor rhinitis.  Also discussed potential to obtain pedometer (i.e. FitBit, Apple watch, etc.) to better monitor total activity throughout the day.  Pt. verbalized understanding & agreement.

## 2023-11-16 ENCOUNTER — TREATMENT (OUTPATIENT)
Dept: PHYSICAL THERAPY | Facility: CLINIC | Age: 68
End: 2023-11-16
Payer: MEDICARE

## 2023-11-16 DIAGNOSIS — M17.0 ARTHRITIS OF BOTH KNEES: ICD-10-CM

## 2023-11-16 DIAGNOSIS — G89.29 CHRONIC PAIN OF BOTH KNEES: Primary | ICD-10-CM

## 2023-11-16 DIAGNOSIS — M25.562 CHRONIC PAIN OF BOTH KNEES: Primary | ICD-10-CM

## 2023-11-16 DIAGNOSIS — M19.072 ARTHRITIS OF BOTH ANKLES: ICD-10-CM

## 2023-11-16 DIAGNOSIS — M25.572 CHRONIC PAIN OF BOTH ANKLES: ICD-10-CM

## 2023-11-16 DIAGNOSIS — M25.571 CHRONIC PAIN OF BOTH ANKLES: ICD-10-CM

## 2023-11-16 DIAGNOSIS — G89.29 CHRONIC PAIN OF BOTH ANKLES: ICD-10-CM

## 2023-11-16 DIAGNOSIS — M19.071 ARTHRITIS OF BOTH ANKLES: ICD-10-CM

## 2023-11-16 DIAGNOSIS — M25.561 CHRONIC PAIN OF BOTH KNEES: Primary | ICD-10-CM

## 2023-11-16 PROCEDURE — 97140 MANUAL THERAPY 1/> REGIONS: CPT | Mod: GP

## 2023-11-16 PROCEDURE — 97535 SELF CARE MNGMENT TRAINING: CPT | Mod: GP

## 2023-11-16 PROCEDURE — 97110 THERAPEUTIC EXERCISES: CPT | Mod: GP

## 2023-11-16 NOTE — PROGRESS NOTES
Physical Therapy    Physical Therapy Treatment    Patient Name: Vielka Geiger  MRN: [id number]  Today's Date: 2023  Time Calculation  Start Time: 1348    Insurance   Visit number: 16 (6 of 8)   30 vs/yr   Medicare Certification Period: Beginnin2023 Endin2023        Assessment:  Pt appears to be making better progress as she reported decreased pain even with increased activity levels.  Pt tolerated tx well & reported decreased pain afterwards.  Anticipate pt. will see improved progress with regular HEP adherence, but they would still benefit from regular f/u's to monitor their progress & ensure appropriate progressions.       Plan:  OP PT Plan  Treatment/Interventions: Dry needling, Education/ Instruction, Electrical stimulation, Gait training, Manual therapy, Neuromuscular re-education, Self care/ home management, Therapeutic activities, Therapeutic exercises  PT Plan: Skilled PT  PT Frequency: 1 time per week  Certification Period Start Date: 23  Certification Period End Date: 23  Number of Treatments Authorized: 8  Rehab Potential: Good  Plan of Care Agreement: Patient      Current Problem  1. Chronic pain of both knees        2. Chronic pain of both ankles        3. Arthritis of both knees        4. Arthritis of both ankles            Subjective   Pt reports she's feeling better & has been doing her HEP & walking more.  She also had her  do the ankle distractions which have been helping.  Pt reports the inner portion of the L knee is a little more aggravated from walking quickly. Pt. rates her hip/knee pain as 3-4/10.      Objective   Observations:  pt. arrived to therapy ambulating IND but with decreased stance time on the L LE. Pt. was wearing B compression stockings (20-30 mmHg). Mod. edema, skin discoloration, & hardening noted B (R > L). Hypertonicity & tenderness also noted in the gastroc/soleus, peroneal, ant./post tibialis mm. Pt. also c/o increased pain  "along the B (R > L) malleoli (lateral > medial). Pt. c/o her 'familiar' L knee pain along the ITB, most notably at the distal insertion.      Manual Therapy ([id number]):  12 minutes  Soft tissue mobilization: superficial effleurage, petrissage, cross-friction, & mod. pressure to the L TFL, gluteals, quadriceps, & ITB, & B gastroc/soleus mm.  Active mm. pumping to the L quadriceps mm.  Joint mobs: supine B TC distraction (grade IV)    Appropriate force, direction, amplitude, & velocity for selected techniques to improve joint & soft tissue mobility & enhance ADL performance. Rationale & procedure given for above treatment techniques, & verbal consent was obtained prior to initiating treatment.       Dry Needling ([id number]):  6 minutes  Location (Needle length x Dosage):  - B gluteus medius (3\" x 4)  - L ITB (2\" x 3)  Type: basic insertion with needles left in situ + e-stim for mm. relaxation in B glute med   Response: no adverse events observed or reported    Pt. educated on theory & practical application of dry needling, as well as potential risks & benefits of needling, & additional verbal consent obtained prior to initiation of needling. Standard precautions, knowledge of appropriate benefits of treatment, & exclusion of relevant contraindications utilized.        Therapeutic Exercises ([id number]):  12 minutes  NuStep (level 6) x 12 min    Verbally reviewed but not actively performed today:   Walking x 200'  B LE calf raises  Walking program  Standing gastroc & soleus stretch  Firm / SLS / Hip ABD (each)  Hook-lying hip ABD iso's into belt  Hook-lying bridge    * = added to HEP. Sheet with exercise descriptions and images issued. Skilled intervention utilized in the appropriate selection & application of above exercises. Verbal and tactile cues provided for proper form and technique. Pt. demonstrated appropriate form & verbalized understanding of optimal technique for above exercises.     TA = transverse abdominis " activation  PPT = posterior pelvic tilt      Self Care / Home Management ([id number]):  9 minutes    Reviewed & updated current HEP:  B LE calf raises  Standing gastroc & soleus stretch  Firm / SLS / Hip ABD (each)  Hook-lying hip ABD iso's into belt  Walking program    Large portion of pt. care time devoted to reviewing pt's current symptoms & determining optimal manual techniques with appropriate HEP modifications. Reviewed importance of regular HEP adherence, & stressed importance of proper form. Reviewed differences between post-exercises soreness vs. increased 'familiar’ pain, & reviewed appropriate progressions/regressions with exercises/activities based on symptoms. Discussed potential to attempt some aquatic therapy, but pt is resistant d/t vasomotor rhinitis.  Also discussed potential to obtain pedometer (i.e. FitBit, Apple watch, etc.) to better monitor total activity throughout the day.  Pt. verbalized understanding & agreement.

## 2023-11-20 ENCOUNTER — TREATMENT (OUTPATIENT)
Dept: PHYSICAL THERAPY | Facility: CLINIC | Age: 68
End: 2023-11-20
Payer: MEDICARE

## 2023-11-20 DIAGNOSIS — G89.29 CHRONIC PAIN OF BOTH ANKLES: ICD-10-CM

## 2023-11-20 DIAGNOSIS — M25.571 CHRONIC PAIN OF BOTH ANKLES: ICD-10-CM

## 2023-11-20 DIAGNOSIS — M19.072 ARTHRITIS OF BOTH ANKLES: ICD-10-CM

## 2023-11-20 DIAGNOSIS — M19.071 ARTHRITIS OF BOTH ANKLES: ICD-10-CM

## 2023-11-20 DIAGNOSIS — M25.562 CHRONIC PAIN OF BOTH KNEES: Primary | ICD-10-CM

## 2023-11-20 DIAGNOSIS — M25.572 CHRONIC PAIN OF BOTH ANKLES: ICD-10-CM

## 2023-11-20 DIAGNOSIS — M25.561 CHRONIC PAIN OF BOTH KNEES: Primary | ICD-10-CM

## 2023-11-20 DIAGNOSIS — G89.29 CHRONIC PAIN OF BOTH KNEES: Primary | ICD-10-CM

## 2023-11-20 DIAGNOSIS — M17.0 ARTHRITIS OF BOTH KNEES: ICD-10-CM

## 2023-11-20 PROCEDURE — 97535 SELF CARE MNGMENT TRAINING: CPT | Mod: GP

## 2023-11-20 PROCEDURE — 97140 MANUAL THERAPY 1/> REGIONS: CPT | Mod: GP

## 2023-11-20 PROCEDURE — 97110 THERAPEUTIC EXERCISES: CPT | Mod: GP

## 2023-11-20 NOTE — PROGRESS NOTES
Physical Therapy    Physical Therapy Treatment    Patient Name: Vielka Geiger  MRN: [id number]  Today's Date: 2023  Time Calculation  Start Time: 1409    Insurance   Visit number: 17 ( of 8)   30 vs/yr 7  Medicare Certification Period: Beginnin2023 Endin2023        Assessment:  Pt's progress has been limited by stress as she deals with multiple family medical crises.  Pt tolerated tx well & reported decreased pain afterwards.  Anticipate pt. will see improved progress with regular HEP adherence, but they would still benefit from regular f/u's to monitor their progress & ensure appropriate progressions.       Plan:  OP PT Plan  Treatment/Interventions: Dry needling, Education/ Instruction, Electrical stimulation, Gait training, Manual therapy, Neuromuscular re-education, Self care/ home management, Therapeutic activities, Therapeutic exercises  PT Plan: Skilled PT  PT Frequency: 1 time per week  Certification Period Start Date: 23  Certification Period End Date: 23  Number of Treatments Authorized: 8  Rehab Potential: Good  Plan of Care Agreement: Patient      Current Problem  1. Chronic pain of both knees        2. Chronic pain of both ankles        3. Arthritis of both knees        4. Arthritis of both ankles            Subjective   Pt reports she's feeling better & has been doing her HEP & walking more.  She also had her  do the ankle distractions which have been helping.  Pt reports the inner portion of the L knee is a little more aggravated from walking quickly. Pt. rates her hip/knee pain as 3-4/10.      Objective   Observations:  pt. arrived to therapy ambulating IND but with decreased stance time on the L LE. Pt. was wearing B compression stockings (20-30 mmHg). Mod. edema, skin discoloration, & hardening noted B (R > L). Hypertonicity & tenderness also noted in the gastroc/soleus, peroneal, ant./post tibialis mm. Pt. also c/o increased pain along the B (R >  "L) malleoli (lateral > medial). Pt. c/o her 'familiar' L knee pain along the ITB, most notably at the distal insertion.      Manual Therapy ([id number]):  15 minutes  Soft tissue mobilization: superficial effleurage, petrissage, cross-friction, & mod. pressure to the L TFL, gluteals, quadriceps, & ITB, & B gastroc/soleus mm.  Active mm. pumping to the L quadriceps mm.  Joint mobs: supine B TC distraction (grade IV)    Appropriate force, direction, amplitude, & velocity for selected techniques to improve joint & soft tissue mobility & enhance ADL performance. Rationale & procedure given for above treatment techniques, & verbal consent was obtained prior to initiating treatment.       Dry Needling ([id number]):  5 minutes  Location (Needle length x Dosage):  - B gluteus medius (3\" x 4)  - L ITB (2\" x 3)  Type: basic insertion with needles left in situ + e-stim for mm. relaxation in B glute med   Response: no adverse events observed or reported    Pt. educated on theory & practical application of dry needling, as well as potential risks & benefits of needling, & additional verbal consent obtained prior to initiation of needling. Standard precautions, knowledge of appropriate benefits of treatment, & exclusion of relevant contraindications utilized.        Therapeutic Exercises ([id number]):  10 minutes  NuStep (level 6) x 10 min    Verbally reviewed but not actively performed today:   Walking x 200'  B LE calf raises  Walking program  Standing gastroc & soleus stretch  Firm / SLS / Hip ABD (each)  Hook-lying hip ABD iso's into belt  Hook-lying bridge    * = added to HEP. Sheet with exercise descriptions and images issued. Skilled intervention utilized in the appropriate selection & application of above exercises. Verbal and tactile cues provided for proper form and technique. Pt. demonstrated appropriate form & verbalized understanding of optimal technique for above exercises.     TA = transverse abdominis activation  PPT = posterior " pelvic tilt      Self Care / Home Management ([id number]):  8 minutes    Reviewed & updated current HEP:  B LE calf raises  Standing gastroc & soleus stretch  Firm / SLS / Hip ABD (each)  Hook-lying hip ABD iso's into belt  Walking program    Large portion of pt. care time devoted to reviewing pt's current symptoms & determining optimal manual techniques with appropriate HEP modifications. Reviewed importance of regular HEP adherence, & stressed importance of proper form. Reviewed differences between post-exercises soreness vs. increased 'familiar’ pain, & reviewed appropriate progressions/regressions with exercises/activities based on symptoms. Discussed potential to attempt some aquatic therapy, but pt is resistant d/t vasomotor rhinitis.  Also discussed potential to obtain pedometer (i.e. FitBit, Apple watch, etc.) to better monitor total activity throughout the day.  Pt. verbalized understanding & agreement.

## 2023-11-30 ENCOUNTER — TREATMENT (OUTPATIENT)
Dept: PHYSICAL THERAPY | Facility: CLINIC | Age: 68
End: 2023-11-30
Payer: MEDICARE

## 2023-11-30 DIAGNOSIS — G89.29 CHRONIC PAIN OF BOTH KNEES: Primary | ICD-10-CM

## 2023-11-30 DIAGNOSIS — M25.571 CHRONIC PAIN OF BOTH ANKLES: ICD-10-CM

## 2023-11-30 DIAGNOSIS — M25.561 CHRONIC PAIN OF BOTH KNEES: Primary | ICD-10-CM

## 2023-11-30 DIAGNOSIS — M25.562 CHRONIC PAIN OF BOTH KNEES: Primary | ICD-10-CM

## 2023-11-30 DIAGNOSIS — M19.072 ARTHRITIS OF BOTH ANKLES: ICD-10-CM

## 2023-11-30 DIAGNOSIS — M19.071 ARTHRITIS OF BOTH ANKLES: ICD-10-CM

## 2023-11-30 DIAGNOSIS — M17.0 ARTHRITIS OF BOTH KNEES: ICD-10-CM

## 2023-11-30 DIAGNOSIS — G89.29 CHRONIC PAIN OF BOTH ANKLES: ICD-10-CM

## 2023-11-30 DIAGNOSIS — M25.572 CHRONIC PAIN OF BOTH ANKLES: ICD-10-CM

## 2023-11-30 PROCEDURE — 97535 SELF CARE MNGMENT TRAINING: CPT | Mod: GP

## 2023-11-30 PROCEDURE — 97110 THERAPEUTIC EXERCISES: CPT | Mod: GP

## 2023-11-30 PROCEDURE — 97140 MANUAL THERAPY 1/> REGIONS: CPT | Mod: GP

## 2023-11-30 NOTE — PROGRESS NOTES
Physical Therapy    Physical Therapy Treatment    Patient Name: Vielka Geiger  MRN: [id number]  Today's Date: 2023  Time Calculation  Start Time: 1347  Stop Time: 1430  Time Calculation (min): 43 min    Insurance   Visit number: 18 (8 of 8)   30 vs/yr   Medicare Re-Certification Period: Beginnin23 - 24      Assessment:  Overall the pt has made slow, but steady progress toward her goals.  She reports decreased pain with ADLs, but still has increased pain with prolonged walking, prolonged standing, & transfers (particularly after sitting for a while).  Pt's progress has also been complicated by multiple family crises which have increased her stress & limited her ability to complete her HEP as prescribed.  Anticipate pt. will see continued progress with regular HEP adherence, but they would still benefit from regular f/u's to monitor their progress & ensure appropriate progressions.       Plan:  OP PT Plan  Treatment/Interventions: Dry needling, Education/ Instruction, Electrical stimulation, Gait training, Manual therapy, Neuromuscular re-education, Self care/ home management, Therapeutic activities, Therapeutic exercises  PT Plan: Skilled PT  PT Frequency: 1 time per week  Certification Period Start Date: 23  Certification Period End Date: 24  Number of Treatments Authorized: 10  Rehab Potential: Good  Plan of Care Agreement: Patient      Current Problem  1. Chronic pain of both knees        2. Chronic pain of both ankles        3. Arthritis of both knees        4. Arthritis of both ankles              Subjective   Pt reports she's feeling a little better overall, but is still having pain with walking > 10 min, or standing > 30 min, & going up/down stairs, particularly as the day goes on.  Pt rates her pain as 5/10, knee pain as 4/10, & ankle pain as 5/10 currently.  Pt reports she generally feels so much better after her treatment but her HEP has still been limited by family  "medical issues as she will need to travel to [place], [place] to help manage a family members medical care.      Objective   Observations:  pt. arrived to therapy ambulating IND but with decreased stance time on the L LE. Pt. was wearing B compression stockings (20-30 mmHg). Mod. edema, skin discoloration, & hardening noted B (R > L). Hypertonicity & tenderness also noted in the gastroc/soleus, peroneal, ant./post tibialis mm. Pt. also c/o increased pain along the B (R > L) malleoli (lateral > medial). Pt. c/o her 'familiar' L knee pain along the ITB, most notably at the distal insertion.      Manual Therapy ([id number]):  16 minutes  Soft tissue mobilization: superficial effleurage, petrissage, cross-friction, & mod. pressure to the L TFL, gluteals, quadriceps, & ITB, & B gastroc/soleus mm.  Active mm. pumping to the L quadriceps mm.  Joint mobs: supine B TC distraction (grade IV)    Appropriate force, direction, amplitude, & velocity for selected techniques to improve joint & soft tissue mobility & enhance ADL performance. Rationale & procedure given for above treatment techniques, & verbal consent was obtained prior to initiating treatment.       Dry Needling ([id number]):  5 minutes  Location (Needle length x Dosage):  - B gluteus medius (3\" x 4)  - L ITB (2\" x 3)  Type: basic insertion with needles left in situ + e-stim for mm. relaxation in B glute med   Response: no adverse events observed or reported    Pt. educated on theory & practical application of dry needling, as well as potential risks & benefits of needling, & additional verbal consent obtained prior to initiation of needling. Standard precautions, knowledge of appropriate benefits of treatment, & exclusion of relevant contraindications utilized.        Therapeutic Exercises ([id number]):  12 minutes  NuStep (level 6) x 12 min    Verbally reviewed but not actively performed today:   Walking x 200'  B LE calf raises  Walking program  Standing gastroc & soleus " stretch  Firm / SLS / Hip ABD (each)  Hook-lying hip ABD iso's into belt  Hook-lying bridge    * = added to HEP. Sheet with exercise descriptions and images issued. Skilled intervention utilized in the appropriate selection & application of above exercises. Verbal and tactile cues provided for proper form and technique. Pt. demonstrated appropriate form & verbalized understanding of optimal technique for above exercises.       Self Care / Home Management ([id number]):  10 minutes    Reviewed & updated current HEP:  B LE calf raises  Standing gastroc & soleus stretch  Firm / SLS / Hip ABD (each)  Hook-lying hip ABD iso's into belt  Walking program    Large portion of pt. care time devoted to reviewing pt's current symptoms & determining optimal manual techniques with appropriate HEP modifications. Reviewed importance of regular HEP adherence, & stressed importance of proper form. Reviewed differences between post-exercises soreness vs. increased 'familiar’ pain, & reviewed appropriate progressions/regressions with exercises/activities based on symptoms. Discussed potential to attempt some aquatic therapy, but pt is resistant d/t vasomotor rhinitis.  Also discussed potential to obtain pedometer (i.e. FitBit, Apple watch, etc.) to better monitor total activity throughout the day.  Pt. verbalized understanding & agreement.        Goals  Demonstrate independence with home exercise program - MET  Increase B LE ROM to pain free + WFL - MET  Increase B LE strength to pain free + WFL - MET  Report decrease in pain by greater than or equal to 2 points to meet the MCID - MET  Increase score of LEFS by greater than or equal to 9 points to meet the MCID - Not met  Perform ADLs without an increase symptoms - Partially met, progressing  Ascend / descend stairs without an increase in symptoms - Partially met, progressing (able to ascend / descend with step to rather than step through pattern)  Ambulate x 1 mile without an increase in  symptoms - Partially met, progressing  Pt. will be able to sleep through the night without an increase in symptoms - MET

## 2023-11-30 NOTE — LETTER
November 30, 2023    Wilber Dorsey PT  [address] [facility]  [address]  [facility] [address]    Patient: Vielka Geiger   YOB: 1955   Date of Visit: 11/30/2023       Dear No referring provider defined for this encounter.    The attached plan of care is being sent to you because your patient’s medical reimbursement requires that you certify the plan of care. Your signature is required to allow uninterrupted insurance coverage.      You may indicate your approval by signing below and faxing this form back to us at Dept Fax: [phone].    Please call Dept: [phone] with any questions or concerns.    Thank you for this referral,        Wilber Dorsey PT  [facility] [address] [facility]  [address] [facility] [address]    Payer: Payor: MEDICARE / Plan: MEDICARE PART A AND B / Product Type: *No Product type* /                                                                         Date:     Dear Wilber Dorsey PT,     Re: Ms. Vielka Geiger, MRN:[id number]    I certify that I have reviewed the attached plan of care and it is medically necessary for Ms. Vielka Geiger (1955) who is under my care.          ______________________________________                    _________________  Provider name and credentials                                           Date and time                                                                                           Plan of Care 11/30/23   Effective from: 11/30/2023  Effective to: 2/28/2024    Plan ID: [id number]            Participants as of Finalize on 11/30/2023    Name Type Comments Contact Info    Wilber Dorsey PT Physical Therapist  [phone]    Lo Duran DO PCP - General  [phone]       Last Plan Note     Author: Wilber Dorsey PT Status: Incomplete Last edited: 11/30/2023  1:45 PM       Physical Therapy    Physical Therapy Treatment    Patient Name: Vielka Geiger  MRN: [id number]  Today's Date: 11/30/2023  Time  Calculation  Start Time: 1347  Stop Time: 1430  Time Calculation (min): 43 min    Insurance   Visit number: 18 (8 of 8)   30 vs/yr   Medicare Re-Certification Period: Beginnin23 - 24      Assessment:  Overall the pt has made slow, but steady progress toward her goals.  She reports decreased pain with ADLs, but still has increased pain with prolonged walking, prolonged standing, & transfers (particularly after sitting for a while).  Pt's progress has also been complicated by multiple family crises which have increased her stress & limited her ability to complete her HEP as prescribed.  Anticipate pt. will see continued progress with regular HEP adherence, but they would still benefit from regular f/u's to monitor their progress & ensure appropriate progressions.       Plan:  OP PT Plan  Treatment/Interventions: Dry needling, Education/ Instruction, Electrical stimulation, Gait training, Manual therapy, Neuromuscular re-education, Self care/ home management, Therapeutic activities, Therapeutic exercises  PT Plan: Skilled PT  PT Frequency: 1 time per week  Certification Period Start Date: 23  Certification Period End Date: 24  Number of Treatments Authorized: 10  Rehab Potential: Good  Plan of Care Agreement: Patient      Current Problem  1. Chronic pain of both knees        2. Chronic pain of both ankles        3. Arthritis of both knees        4. Arthritis of both ankles              Subjective  Pt reports she's feeling a little better overall, but is still having pain with walking > 10 min, or standing > 30 min, & going up/down stairs, particularly as the day goes on.  Pt rates her pain as 5/10, knee pain as 4/10, & ankle pain as 5/10 currently.  Pt reports she generally feels so much better after her treatment but her HEP has still been limited by family medical issues as she will need to travel to [place], [place] to help manage a family members medical care.      Objective  Observations:  pt.  "arrived to therapy ambulating IND but with decreased stance time on the L LE. Pt. was wearing B compression stockings (20-30 mmHg). Mod. edema, skin discoloration, & hardening noted B (R > L). Hypertonicity & tenderness also noted in the gastroc/soleus, peroneal, ant./post tibialis mm. Pt. also c/o increased pain along the B (R > L) malleoli (lateral > medial). Pt. c/o her 'familiar' L knee pain along the ITB, most notably at the distal insertion.      Manual Therapy (33855):  16 minutes  Soft tissue mobilization: superficial effleurage, petrissage, cross-friction, & mod. pressure to the L TFL, gluteals, quadriceps, & ITB, & B gastroc/soleus mm.  Active mm. pumping to the L quadriceps mm.  Joint mobs: supine B TC distraction (grade IV)    Appropriate force, direction, amplitude, & velocity for selected techniques to improve joint & soft tissue mobility & enhance ADL performance. Rationale & procedure given for above treatment techniques, & verbal consent was obtained prior to initiating treatment.       Dry Needling (64542):  5 minutes  Location (Needle length x Dosage):  - B gluteus medius (3\" x 4)  - L ITB (2\" x 3)  Type: basic insertion with needles left in situ + e-stim for mm. relaxation in B glute med   Response: no adverse events observed or reported    Pt. educated on theory & practical application of dry needling, as well as potential risks & benefits of needling, & additional verbal consent obtained prior to initiation of needling. Standard precautions, knowledge of appropriate benefits of treatment, & exclusion of relevant contraindications utilized.        Therapeutic Exercises ([id number]):  12 minutes  NuStep (level 6) x 12 min    Verbally reviewed but not actively performed today:   Walking x 200'  B LE calf raises  Walking program  Standing gastroc & soleus stretch  Firm / SLS / Hip ABD (each)  Hook-lying hip ABD iso's into belt  Hook-lying bridge    * = added to HEP. Sheet with exercise descriptions and " images issued. Skilled intervention utilized in the appropriate selection & application of above exercises. Verbal and tactile cues provided for proper form and technique. Pt. demonstrated appropriate form & verbalized understanding of optimal technique for above exercises.       Self Care / Home Management (81406):  10 minutes    Reviewed & updated current HEP:  B LE calf raises  Standing gastroc & soleus stretch  Firm / SLS / Hip ABD (each)  Hook-lying hip ABD iso's into belt  Walking program    Large portion of pt. care time devoted to reviewing pt's current symptoms & determining optimal manual techniques with appropriate HEP modifications. Reviewed importance of regular HEP adherence, & stressed importance of proper form. Reviewed differences between post-exercises soreness vs. increased 'familiar’ pain, & reviewed appropriate progressions/regressions with exercises/activities based on symptoms. Discussed potential to attempt some aquatic therapy, but pt is resistant d/t vasomotor rhinitis.  Also discussed potential to obtain pedometer (i.e. FitBit, Apple watch, etc.) to better monitor total activity throughout the day.  Pt. verbalized understanding & agreement.        Goals  Demonstrate independence with home exercise program - MET  Increase B LE ROM to pain free + WFL - MET  Increase B LE strength to pain free + WFL - MET  Report decrease in pain by greater than or equal to 2 points to meet the MCID - MET  Increase score of LEFS by greater than or equal to 9 points to meet the MCID - Not met  Perform ADLs without an increase symptoms - Partially met, progressing  Ascend / descend stairs without an increase in symptoms - Partially met, progressing (able to ascend / descend with step to rather than step through pattern)  Ambulate x 1 mile without an increase in symptoms - Partially met, progressing  Pt. will be able to sleep through the night without an increase in symptoms - MET           Current Participants  as of 11/30/2023    Name Type Comments Contact Info    Wilber Dorsey, PT Physical Therapist  [phone]    Signature pending    Lo Duran DO PCP - General  [phone]    Signature pending

## 2023-12-07 ENCOUNTER — TELEPHONE (OUTPATIENT)
Dept: PRIMARY CARE | Facility: CLINIC | Age: 68
End: 2023-12-07
Payer: MEDICARE

## 2023-12-07 ENCOUNTER — APPOINTMENT (OUTPATIENT)
Dept: PHYSICAL THERAPY | Facility: CLINIC | Age: 68
End: 2023-12-07
Payer: MEDICARE

## 2023-12-07 DIAGNOSIS — I10 ESSENTIAL HYPERTENSION: ICD-10-CM

## 2023-12-07 DIAGNOSIS — E78.2 MIXED HYPERLIPIDEMIA: Primary | ICD-10-CM

## 2023-12-07 DIAGNOSIS — E03.9 HYPOTHYROIDISM, ADULT: ICD-10-CM

## 2023-12-07 NOTE — TELEPHONE ENCOUNTER
Patient would like to come in to do her blood work tomorrow so she can have the result for her appointment on 12/11/23.  When orders are placed, please call her at [phone]

## 2023-12-08 ENCOUNTER — LAB (OUTPATIENT)
Dept: LAB | Facility: LAB | Age: 68
End: 2023-12-08
Payer: MEDICARE

## 2023-12-08 DIAGNOSIS — E03.9 HYPOTHYROIDISM, ADULT: ICD-10-CM

## 2023-12-08 DIAGNOSIS — M1A.9XX0 CHRONIC GOUT INVOLVING TOE WITHOUT TOPHUS, UNSPECIFIED CAUSE, UNSPECIFIED LATERALITY: ICD-10-CM

## 2023-12-08 DIAGNOSIS — E78.2 MIXED HYPERLIPIDEMIA: ICD-10-CM

## 2023-12-08 DIAGNOSIS — I10 ESSENTIAL HYPERTENSION: ICD-10-CM

## 2023-12-08 LAB
ALBUMIN SERPL BCP-MCNC: 4.1 G/DL (ref 3.4–5)
ALP SERPL-CCNC: 69 U/L (ref 33–136)
ALT SERPL W P-5'-P-CCNC: 31 U/L (ref 7–45)
ANION GAP SERPL CALC-SCNC: 10 MMOL/L (ref 10–20)
AST SERPL W P-5'-P-CCNC: 21 U/L (ref 9–39)
BILIRUB SERPL-MCNC: 0.5 MG/DL (ref 0–1.2)
BUN SERPL-MCNC: 26 MG/DL (ref 6–23)
CALCIUM SERPL-MCNC: 9.2 MG/DL (ref 8.6–10.3)
CHLORIDE SERPL-SCNC: 103 MMOL/L (ref 98–107)
CHOLEST SERPL-MCNC: 169 MG/DL (ref 0–199)
CHOLESTEROL/HDL RATIO: 3.5
CO2 SERPL-SCNC: 32 MMOL/L (ref 21–32)
CREAT SERPL-MCNC: 1.07 MG/DL (ref 0.5–1.05)
ERYTHROCYTE [DISTWIDTH] IN BLOOD BY AUTOMATED COUNT: 13.4 % (ref 11.5–14.5)
GFR SERPL CREATININE-BSD FRML MDRD: 56 ML/MIN/1.73M*2
GLUCOSE SERPL-MCNC: 92 MG/DL (ref 74–99)
HCT VFR BLD AUTO: 43.1 % (ref 36–46)
HDLC SERPL-MCNC: 48.2 MG/DL
HGB BLD-MCNC: 13.8 G/DL (ref 12–16)
LDLC SERPL CALC-MCNC: 91 MG/DL
MCH RBC QN AUTO: 30 PG (ref 26–34)
MCHC RBC AUTO-ENTMCNC: 32 G/DL (ref 32–36)
MCV RBC AUTO: 94 FL (ref 80–100)
NON HDL CHOLESTEROL: 121 MG/DL (ref 0–149)
NRBC BLD-RTO: 0 /100 WBCS (ref 0–0)
PLATELET # BLD AUTO: 294 X10*3/UL (ref 150–450)
POTASSIUM SERPL-SCNC: 4 MMOL/L (ref 3.5–5.3)
PROT SERPL-MCNC: 6.9 G/DL (ref 6.4–8.2)
RBC # BLD AUTO: 4.6 X10*6/UL (ref 4–5.2)
SODIUM SERPL-SCNC: 141 MMOL/L (ref 136–145)
TRIGL SERPL-MCNC: 147 MG/DL (ref 0–149)
TSH SERPL-ACNC: 1.48 MIU/L (ref 0.44–3.98)
VLDL: 29 MG/DL (ref 0–40)
WBC # BLD AUTO: 6.4 X10*3/UL (ref 4.4–11.3)

## 2023-12-08 PROCEDURE — 80061 LIPID PANEL: CPT

## 2023-12-08 PROCEDURE — 86140 C-REACTIVE PROTEIN: CPT

## 2023-12-08 PROCEDURE — 85027 COMPLETE CBC AUTOMATED: CPT

## 2023-12-08 PROCEDURE — 84443 ASSAY THYROID STIM HORMONE: CPT

## 2023-12-08 PROCEDURE — 36415 COLL VENOUS BLD VENIPUNCTURE: CPT

## 2023-12-08 PROCEDURE — 80053 COMPREHEN METABOLIC PANEL: CPT

## 2023-12-11 ENCOUNTER — OFFICE VISIT (OUTPATIENT)
Dept: PRIMARY CARE | Facility: CLINIC | Age: 68
End: 2023-12-11
Payer: MEDICARE

## 2023-12-11 VITALS
RESPIRATION RATE: 18 BRPM | BODY MASS INDEX: 40.43 KG/M2 | OXYGEN SATURATION: 98 % | DIASTOLIC BLOOD PRESSURE: 81 MMHG | WEIGHT: 273 LBS | HEART RATE: 80 BPM | TEMPERATURE: 97.3 F | HEIGHT: 69 IN | SYSTOLIC BLOOD PRESSURE: 156 MMHG

## 2023-12-11 DIAGNOSIS — Z23 NEED FOR IMMUNIZATION AGAINST INFLUENZA: ICD-10-CM

## 2023-12-11 DIAGNOSIS — Z00.00 HEALTHCARE MAINTENANCE: Primary | ICD-10-CM

## 2023-12-11 DIAGNOSIS — Z13.6 SCREENING FOR CARDIOVASCULAR CONDITION: ICD-10-CM

## 2023-12-11 DIAGNOSIS — Z78.0 ASYMPTOMATIC MENOPAUSAL STATE: ICD-10-CM

## 2023-12-11 DIAGNOSIS — Z00.00 ROUTINE GENERAL MEDICAL EXAMINATION AT HEALTH CARE FACILITY: ICD-10-CM

## 2023-12-11 DIAGNOSIS — M1A.9XX0 CHRONIC GOUT INVOLVING TOE WITHOUT TOPHUS, UNSPECIFIED CAUSE, UNSPECIFIED LATERALITY: ICD-10-CM

## 2023-12-11 LAB
CRP SERPL-MCNC: 0.14 MG/DL
POC APPEARANCE, URINE: CLEAR
POC BILIRUBIN, URINE: NEGATIVE
POC BLOOD, URINE: NEGATIVE
POC COLOR, URINE: NORMAL
POC GLUCOSE, URINE: NEGATIVE MG/DL
POC KETONES, URINE: NEGATIVE MG/DL
POC LEUKOCYTES, URINE: NEGATIVE
POC NITRITE,URINE: NEGATIVE
POC PH, URINE: 7 PH
POC PROTEIN, URINE: NEGATIVE MG/DL
POC SPECIFIC GRAVITY, URINE: 1.01
POC UROBILINOGEN, URINE: 0.2 EU/DL

## 2023-12-11 PROCEDURE — G0403 EKG FOR INITIAL PREVENT EXAM: HCPCS | Performed by: FAMILY MEDICINE

## 2023-12-11 PROCEDURE — 3079F DIAST BP 80-89 MM HG: CPT | Performed by: FAMILY MEDICINE

## 2023-12-11 PROCEDURE — 1036F TOBACCO NON-USER: CPT | Performed by: FAMILY MEDICINE

## 2023-12-11 PROCEDURE — G0008 ADMIN INFLUENZA VIRUS VAC: HCPCS | Performed by: FAMILY MEDICINE

## 2023-12-11 PROCEDURE — 90662 IIV NO PRSV INCREASED AG IM: CPT | Performed by: FAMILY MEDICINE

## 2023-12-11 PROCEDURE — 1159F MED LIST DOCD IN RCRD: CPT | Performed by: FAMILY MEDICINE

## 2023-12-11 PROCEDURE — 3077F SYST BP >= 140 MM HG: CPT | Performed by: FAMILY MEDICINE

## 2023-12-11 PROCEDURE — 1170F FXNL STATUS ASSESSED: CPT | Performed by: FAMILY MEDICINE

## 2023-12-11 PROCEDURE — 81002 URINALYSIS NONAUTO W/O SCOPE: CPT | Performed by: FAMILY MEDICINE

## 2023-12-11 PROCEDURE — G0402 INITIAL PREVENTIVE EXAM: HCPCS | Performed by: FAMILY MEDICINE

## 2023-12-11 ASSESSMENT — ENCOUNTER SYMPTOMS
GASTROINTESTINAL NEGATIVE: 1
OCCASIONAL FEELINGS OF UNSTEADINESS: 0
LOSS OF SENSATION IN FEET: 0
RESPIRATORY NEGATIVE: 1
ENDOCRINE NEGATIVE: 1
DEPRESSION: 0
PSYCHIATRIC NEGATIVE: 1
CONSTITUTIONAL NEGATIVE: 1
NEUROLOGICAL NEGATIVE: 1
CARDIOVASCULAR NEGATIVE: 1
EYES NEGATIVE: 1

## 2023-12-11 ASSESSMENT — VISUAL ACUITY
OS_CC: 20/20
OD_CC: 20/20

## 2023-12-11 ASSESSMENT — ACTIVITIES OF DAILY LIVING (ADL)
BATHING: INDEPENDENT
DRESSING: INDEPENDENT
MANAGING_FINANCES: INDEPENDENT
TAKING_MEDICATION: INDEPENDENT
DOING_HOUSEWORK: INDEPENDENT
GROCERY_SHOPPING: INDEPENDENT

## 2023-12-11 ASSESSMENT — PATIENT HEALTH QUESTIONNAIRE - PHQ9
SUM OF ALL RESPONSES TO PHQ9 QUESTIONS 1 AND 2: 0
2. FEELING DOWN, DEPRESSED OR HOPELESS: NOT AT ALL
1. LITTLE INTEREST OR PLEASURE IN DOING THINGS: NOT AT ALL

## 2023-12-11 NOTE — PROGRESS NOTES
"Subjective   Reason for Visit: Vielka Geiger is an 68 y.o. female here for a Medicare Wellness visit.        Reviewed all medications by prescribing practitioner or clinical pharmacist (such as prescriptions, OTCs, herbal therapies and supplements) and documented in the medical record.    Patient Care Team:  Lo Duran DO as PCP - General     Review of Systems   Constitutional: Negative.    HENT: Negative.     Eyes: Negative.    Respiratory: Negative.     Cardiovascular: Negative.    Gastrointestinal: Negative.    Endocrine: Negative.    Genitourinary: Negative.    Skin: Negative.    Neurological: Negative.    Psychiatric/Behavioral: Negative.         Objective   Vitals:  /81   Pulse 80   Temp 36.3 °C (97.3 °F)   Resp 18   Ht 1.746 m (5' 8.75\")   Wt 124 kg (273 lb)   SpO2 98%   BMI 40.61 kg/m²       Physical Exam  Constitutional:       General: She is not in acute distress.     Appearance: Normal appearance.   HENT:      Head: Normocephalic and atraumatic.      Right Ear: Tympanic membrane normal.      Left Ear: Tympanic membrane normal.      Nose: Nose normal.      Mouth/Throat:      Pharynx: Oropharynx is clear.   Eyes:      Conjunctiva/sclera: Conjunctivae normal.      Pupils: Pupils are equal, round, and reactive to light.   Neck:      Vascular: No carotid bruit.   Cardiovascular:      Rate and Rhythm: Normal rate and regular rhythm.      Heart sounds: Normal heart sounds.   Pulmonary:      Effort: Pulmonary effort is normal.      Breath sounds: Normal breath sounds.   Abdominal:      General: Bowel sounds are normal.      Palpations: Abdomen is soft.   Musculoskeletal:      Cervical back: Neck supple. No tenderness.   Skin:     General: Skin is warm and dry.   Neurological:      General: No focal deficit present.      Mental Status: She is alert.   Psychiatric:         Mood and Affect: Mood normal.         Behavior: Behavior normal.         Assessment/Plan   Problem List Items " Addressed This Visit       Gout    Relevant Orders    C-reactive protein     Other Visit Diagnoses       Healthcare maintenance    -  Primary    Relevant Orders    POCT UA (nonautomated) manually resulted (Completed)    ECG 12 lead (Clinic Performed) (Completed)    Screening for cardiovascular condition        Relevant Orders    ECG 12 lead    Asymptomatic menopausal state        Relevant Orders    XR DEXA bone density    Routine general medical examination at health care facility                 Pt is on losartan/hydrochlorothiazide AND lasix. This could be the reason her BUN/Cr are elevated. I recommend she discuss this with Sun, but I would not think she needs both. I recommend she also stop the lasix or at minimum cut it in half.

## 2023-12-11 NOTE — PROGRESS NOTES
"Subjective     Patient ID: Vielka Geiger is a 68 y.o. female who presents for Annual Exam (Welcome to Medicare - Pt sees Dr. Garsia in Jan).      Review of Systems   Constitutional: Negative.    HENT: Negative.     Eyes: Negative.    Respiratory: Negative.     Cardiovascular: Negative.    Gastrointestinal: Negative.    Endocrine: Negative.    Genitourinary: Negative.    Skin: Negative.    Neurological: Negative.    Psychiatric/Behavioral: Negative.         Objective     Vitals:    12/11/23 1301   BP: 156/81   Pulse: 80   Resp: 18   Temp: 36.3 °C (97.3 °F)   SpO2: 98%   Weight: 124 kg (273 lb)   Height: 1.746 m (5' 8.75\")        Current Outpatient Medications   Medication Instructions    aspirin 81 mg EC tablet Aspirin 81 81 MG TBEC   Refills: 0       Active    docusate sodium (COLACE) 100 mg, oral, Daily    efinaconazole (Jublia) 10 % solution with applicator daily    furosemide (LASIX) 40 mg, oral, Daily    losartan (COZAAR) 100 mg, oral, Daily    losartan-hydrochlorothiazide (Hyzaar) 100-12.5 mg tablet 1 tablet, oral, Daily    naftifine (Naftin) 2 % cream as needed.    pentoxifylline (Trental) 400 mg ER tablet Do not crush, chew, or split.     simvastatin (Zocor) 40 mg tablet 1 tablet, oral, Nightly    sulfacetamide sodium-sulfur 8-4 % suspension 1 Application    Synthroid 100 mcg, oral, Daily before breakfast, DAW5    tazarotene (Tazorac) 0.1 % cream Tazarotene 0.1 % External Cream   Refills: 0       Active    triamcinolone acetonide 0.05 % ointment Triamcinolone Acetonide 0.05 % External Ointment   Refills: 0       Active        Physical Exam  Constitutional:       General: She is not in acute distress.     Appearance: Normal appearance.   HENT:      Head: Normocephalic and atraumatic.      Right Ear: Tympanic membrane normal.      Left Ear: Tympanic membrane normal.      Nose: Nose normal.      Mouth/Throat:      Pharynx: Oropharynx is clear.   Eyes:      Conjunctiva/sclera: Conjunctivae normal.      " Pupils: Pupils are equal, round, and reactive to light.   Neck:      Vascular: No carotid bruit.   Cardiovascular:      Rate and Rhythm: Normal rate and regular rhythm.      Heart sounds: Normal heart sounds.   Pulmonary:      Effort: Pulmonary effort is normal.      Breath sounds: Normal breath sounds.   Abdominal:      General: Bowel sounds are normal.      Palpations: Abdomen is soft.   Musculoskeletal:      Cervical back: Neck supple. No tenderness.   Skin:     General: Skin is warm and dry.   Neurological:      General: No focal deficit present.      Mental Status: She is alert.   Psychiatric:         Mood and Affect: Mood normal.         Behavior: Behavior normal.         Assessment/Plan   Problem List Items Addressed This Visit    None  Visit Diagnoses         Codes    Healthcare maintenance    -  Primary Z00.00    Relevant Orders    POCT UA (nonautomated) manually resulted (Completed)    ECG 12 lead (Clinic Performed) (Completed)        Pt is on losartan/hydrochlorothiazide AND lasix. This could be the reason her BUN/Cr are elevated. I recommend she discuss this with Sun, but I would not think she needs both. I recommend she also stop the lasix or at minimum cut it in half.

## 2023-12-12 ENCOUNTER — TELEPHONE (OUTPATIENT)
Dept: PRIMARY CARE | Facility: CLINIC | Age: 68
End: 2023-12-12
Payer: MEDICARE

## 2023-12-12 NOTE — TELEPHONE ENCOUNTER
Patient had her Shingrex shots at [facility].    #1 - 01/12/2020  #2 - 06/25/2020

## 2023-12-13 ENCOUNTER — TREATMENT (OUTPATIENT)
Dept: PHYSICAL THERAPY | Facility: CLINIC | Age: 68
End: 2023-12-13
Payer: MEDICARE

## 2023-12-13 DIAGNOSIS — M19.071 ARTHRITIS OF BOTH ANKLES: ICD-10-CM

## 2023-12-13 DIAGNOSIS — G89.29 CHRONIC PAIN OF BOTH KNEES: Primary | ICD-10-CM

## 2023-12-13 DIAGNOSIS — G89.29 CHRONIC PAIN OF BOTH ANKLES: ICD-10-CM

## 2023-12-13 DIAGNOSIS — M25.561 CHRONIC PAIN OF BOTH KNEES: Primary | ICD-10-CM

## 2023-12-13 DIAGNOSIS — M17.0 ARTHRITIS OF BOTH KNEES: ICD-10-CM

## 2023-12-13 DIAGNOSIS — M19.072 ARTHRITIS OF BOTH ANKLES: ICD-10-CM

## 2023-12-13 DIAGNOSIS — M25.571 CHRONIC PAIN OF BOTH ANKLES: ICD-10-CM

## 2023-12-13 DIAGNOSIS — M25.562 CHRONIC PAIN OF BOTH KNEES: Primary | ICD-10-CM

## 2023-12-13 DIAGNOSIS — M25.572 CHRONIC PAIN OF BOTH ANKLES: ICD-10-CM

## 2023-12-13 PROCEDURE — 97140 MANUAL THERAPY 1/> REGIONS: CPT | Mod: GP

## 2023-12-13 PROCEDURE — 97110 THERAPEUTIC EXERCISES: CPT | Mod: GP

## 2023-12-13 PROCEDURE — 97535 SELF CARE MNGMENT TRAINING: CPT | Mod: GP

## 2023-12-13 NOTE — PROGRESS NOTES
Physical Therapy    Physical Therapy Treatment    Patient Name: Vielka Geiger  MRN: [id number]  Today's Date: 2023  Time Calculation  Start Time: 1409  Stop Time: 1505  Time Calculation (min): 56 min    Insurance   Visit number: 19 (1 of 10)   30 vs/yr   Medicare Re-Certification Period: Beginnin23 - 24      Assessment:  Pt's symptoms have been somewhat aggravated over the past couple weeks, likely from dealing with multiple family crises. Pt tolerated treatment well & reported decreased afterwards.  Anticipate pt. will see continued progress with regular HEP adherence, but they would still benefit from regular f/u's to monitor their progress & ensure appropriate progressions.       Plan:  OP PT Plan  Treatment/Interventions: Dry needling, Education/ Instruction, Electrical stimulation, Gait training, Manual therapy, Neuromuscular re-education, Self care/ home management, Therapeutic activities, Therapeutic exercises  PT Plan: Skilled PT  PT Frequency: 1 time per week  Certification Period Start Date: 23  Certification Period End Date: 24  Number of Treatments Authorized: 10  Rehab Potential: Good  Plan of Care Agreement: Patient      Current Problem  1. Chronic pain of both knees        2. Chronic pain of both ankles        3. Arthritis of both knees        4. Arthritis of both ankles            Subjective   Pt reports her back, hips, knees, & ankles are all hurting more.  Pt hasn't been sleeping very well d/t stress & she's been trying manage her family's medical problems.        Objective   Observations:  pt. arrived to therapy ambulating IND but with decreased stance time on the L LE. Pt. was wearing B compression stockings (20-30 mmHg). Mod. edema, skin discoloration, & hardening noted B (R > L). Hypertonicity & tenderness also noted in the gastroc/soleus, peroneal, ant./post tibialis mm. Pt. also c/o increased pain along the B (R > L) malleoli (lateral > medial). Pt. c/o her  "'familiar' L knee pain along the ITB, most notably at the distal insertion.      Manual Therapy (99661):  25 minutes  Soft tissue mobilization: superficial effleurage, petrissage, cross-friction, & mod. pressure to the L TFL, gluteals, quadriceps, & ITB, & B gastroc/soleus mm.  Active mm. pumping to the L quadriceps mm.  Joint mobs: supine B TC distraction (grade IV)    Appropriate force, direction, amplitude, & velocity for selected techniques to improve joint & soft tissue mobility & enhance ADL performance. Rationale & procedure given for above treatment techniques, & verbal consent was obtained prior to initiating treatment.       Dry Needling (42269):  5 minutes  Location (Needle length x Dosage):  - B gluteus medius (3\" x 4)  Type: basic insertion with needles left in situ + e-stim for mm. relaxation in B glute med   Response: no adverse events observed or reported    Pt. educated on theory & practical application of dry needling, as well as potential risks & benefits of needling, & additional verbal consent obtained prior to initiation of needling. Standard precautions, knowledge of appropriate benefits of treatment, & exclusion of relevant contraindications utilized.        Therapeutic Exercises (19121):  15 minutes  NuStep (level 6) x 15 min    Verbally reviewed but not actively performed today:   Walking x 200'  B LE calf raises  Walking program  Standing gastroc & soleus stretch  Firm / SLS / Hip ABD (each)  Hook-lying hip ABD iso's into belt  Hook-lying bridge    * = added to HEP. Sheet with exercise descriptions and images issued. Skilled intervention utilized in the appropriate selection & application of above exercises. Verbal and tactile cues provided for proper form and technique. Pt. demonstrated appropriate form & verbalized understanding of optimal technique for above exercises.       Self Care / Home Management (42577):  11 minutes    Reviewed & updated current HEP:  B LE calf raises  Standing " gastroc & soleus stretch  Firm / SLS / Hip ABD (each)  Hook-lying hip ABD iso's into belt  Walking program    Large portion of pt. care time devoted to reviewing pt's current symptoms & determining optimal manual techniques with appropriate HEP modifications. Reviewed importance of regular HEP adherence, & stressed importance of proper form. Reviewed differences between post-exercises soreness vs. increased 'familiar’ pain, & reviewed appropriate progressions/regressions with exercises/activities based on symptoms. Discussed potential to attempt some aquatic therapy, but pt is resistant d/t vasomotor rhinitis.  Also discussed potential to obtain pedometer (i.e. FitBit, Apple watch, etc.) to better monitor total activity throughout the day.  Pt. verbalized understanding & agreement.

## 2023-12-20 ENCOUNTER — TREATMENT (OUTPATIENT)
Dept: PHYSICAL THERAPY | Facility: CLINIC | Age: 68
End: 2023-12-20
Payer: MEDICARE

## 2023-12-20 DIAGNOSIS — M25.572 CHRONIC PAIN OF BOTH ANKLES: ICD-10-CM

## 2023-12-20 DIAGNOSIS — G89.29 CHRONIC PAIN OF BOTH ANKLES: ICD-10-CM

## 2023-12-20 DIAGNOSIS — M25.571 CHRONIC PAIN OF BOTH ANKLES: ICD-10-CM

## 2023-12-20 DIAGNOSIS — M25.562 CHRONIC PAIN OF BOTH KNEES: Primary | ICD-10-CM

## 2023-12-20 DIAGNOSIS — M17.0 ARTHRITIS OF BOTH KNEES: ICD-10-CM

## 2023-12-20 DIAGNOSIS — M19.072 ARTHRITIS OF BOTH ANKLES: ICD-10-CM

## 2023-12-20 DIAGNOSIS — M19.071 ARTHRITIS OF BOTH ANKLES: ICD-10-CM

## 2023-12-20 DIAGNOSIS — M25.561 CHRONIC PAIN OF BOTH KNEES: Primary | ICD-10-CM

## 2023-12-20 DIAGNOSIS — G89.29 CHRONIC PAIN OF BOTH KNEES: Primary | ICD-10-CM

## 2023-12-20 PROCEDURE — 97535 SELF CARE MNGMENT TRAINING: CPT | Mod: GP,KX

## 2023-12-20 PROCEDURE — 97110 THERAPEUTIC EXERCISES: CPT | Mod: GP,KX

## 2023-12-20 PROCEDURE — 97140 MANUAL THERAPY 1/> REGIONS: CPT | Mod: GP,KX

## 2023-12-20 NOTE — PROGRESS NOTES
Physical Therapy    Physical Therapy Treatment    Patient Name: Vielka Geiger  MRN: [id number]  Today's Date: 2023  Time Calculation  Start Time: 1403  Stop Time: 1443  Time Calculation (min): 40 min    Insurance   Visit number: 20 (2 of 10)   30 vs/yr   Medicare Re-Certification Period: Beginnin23 - 24      Assessment:  Pt tolerated tx well & reported decreased back, hip & ankle pain afterwards.  Anticipate pt. will see continued progress with regular HEP adherence, but they would still benefit from regular f/u's to monitor their progress & ensure appropriate progressions.       Plan:  OP PT Plan  Treatment/Interventions: Dry needling, Education/ Instruction, Electrical stimulation, Gait training, Manual therapy, Neuromuscular re-education, Self care/ home management, Therapeutic activities, Therapeutic exercises  PT Plan: Skilled PT  PT Frequency: 1 time per week  Certification Period Start Date: 23  Certification Period End Date: 24  Number of Treatments Authorized: 10  Rehab Potential: Good  Plan of Care Agreement: Patient      Current Problem  1. Chronic pain of both knees        2. Chronic pain of both ankles        3. Arthritis of both knees        4. Arthritis of both ankles              Subjective   Pt reports her L hip is a little more sore than the R, but both are sore & the low back has been hurting.  Pt reports her back was a little more sore & she didn't have as much relief after the DN after her last appt.  Pt rates her hip pain as 5-6/10 & low back is 7-8/10 currently      Objective   Observations:  pt. arrived to therapy ambulating IND but with decreased stance time on the L LE. Pt. was wearing B compression stockings (20-30 mmHg). Mod. edema, skin discoloration, & hardening noted B (R > L). Hypertonicity & tenderness also noted in the gastroc/soleus, peroneal, ant./post tibialis mm. Pt. also c/o increased pain along the B (R > L) malleoli (lateral > medial). Pt.  "c/o her 'familiar' L knee pain along the ITB, most notably at the distal insertion.      Manual Therapy (02505):  15 minutes  Soft tissue mobilization: superficial effleurage, petrissage, cross-friction, & mod. pressure to the L TFL, gluteals, quadriceps, & ITB, & B gastroc/soleus mm.  Active mm. pumping to the L quadriceps mm.  Joint mobs: supine B TC distraction (grade IV)    Appropriate force, direction, amplitude, & velocity for selected techniques to improve joint & soft tissue mobility & enhance ADL performance. Rationale & procedure given for above treatment techniques, & verbal consent was obtained prior to initiating treatment.       Dry Needling (38815):  5 minutes  Location (Needle length x Dosage):  - B gluteus medius (3\" x 4)  Type: basic insertion with needles left in situ + e-stim for mm. relaxation in B glute med   Response: no adverse events observed or reported    Pt. educated on theory & practical application of dry needling, as well as potential risks & benefits of needling, & additional verbal consent obtained prior to initiation of needling. Standard precautions, knowledge of appropriate benefits of treatment, & exclusion of relevant contraindications utilized.        Therapeutic Exercises (78025):  15 minutes  Seated lumbar flexion  Seated piriformis stretch (each)*  Hook-lying TA + PPT*    Verbally reviewed but not actively performed today:   Walking x 200'  B LE calf raises  Walking program  Standing gastroc & soleus stretch  Firm / SLS / Hip ABD (each)  Hook-lying hip ABD iso's into belt  Hook-lying bridge  NuStep (level 6) x 10 min    * = added to HEP. Sheet with exercise descriptions and images issued. Skilled intervention utilized in the appropriate selection & application of above exercises. Verbal and tactile cues provided for proper form and technique. Pt. demonstrated appropriate form & verbalized understanding of optimal technique for above exercises.       Self Care / Home " Management (62599):  10 minutes    Reviewed & updated current HEP:  B LE calf raises  Standing gastroc & soleus stretch  Firm / SLS / Hip ABD (each)  Hook-lying hip ABD iso's into belt  Seated piriformis stretch (each)  Hook-lying TA + PPT  Walking program    Large portion of pt. care time devoted to reviewing pt's current symptoms & determining optimal manual techniques with appropriate HEP modifications. Reviewed importance of regular HEP adherence, & stressed importance of proper form. Reviewed differences between post-exercises soreness vs. increased 'familiar’ pain, & reviewed appropriate progressions/regressions with exercises/activities based on symptoms. Discussed potential to attempt some aquatic therapy, but pt is resistant d/t vasomotor rhinitis.  Also discussed potential to obtain pedometer (i.e. FitBit, Apple watch, etc.) to better monitor total activity throughout the day.  Pt. verbalized understanding & agreement.

## 2024-01-04 ENCOUNTER — TREATMENT (OUTPATIENT)
Dept: PHYSICAL THERAPY | Facility: CLINIC | Age: 69
End: 2024-01-04
Payer: MEDICARE

## 2024-01-04 DIAGNOSIS — G89.29 CHRONIC PAIN OF BOTH ANKLES: ICD-10-CM

## 2024-01-04 DIAGNOSIS — G89.29 CHRONIC PAIN OF BOTH KNEES: Primary | ICD-10-CM

## 2024-01-04 DIAGNOSIS — M19.071 ARTHRITIS OF BOTH ANKLES: ICD-10-CM

## 2024-01-04 DIAGNOSIS — M17.0 ARTHRITIS OF BOTH KNEES: ICD-10-CM

## 2024-01-04 DIAGNOSIS — M25.572 CHRONIC PAIN OF BOTH ANKLES: ICD-10-CM

## 2024-01-04 DIAGNOSIS — M25.571 CHRONIC PAIN OF BOTH ANKLES: ICD-10-CM

## 2024-01-04 DIAGNOSIS — M25.561 CHRONIC PAIN OF BOTH KNEES: Primary | ICD-10-CM

## 2024-01-04 DIAGNOSIS — M25.562 CHRONIC PAIN OF BOTH KNEES: Primary | ICD-10-CM

## 2024-01-04 DIAGNOSIS — M19.072 ARTHRITIS OF BOTH ANKLES: ICD-10-CM

## 2024-01-04 PROCEDURE — 97140 MANUAL THERAPY 1/> REGIONS: CPT | Mod: GP

## 2024-01-04 PROCEDURE — 97535 SELF CARE MNGMENT TRAINING: CPT | Mod: GP

## 2024-01-04 PROCEDURE — 97110 THERAPEUTIC EXERCISES: CPT | Mod: GP

## 2024-01-04 NOTE — PROGRESS NOTES
Physical Therapy    Physical Therapy Treatment    Patient Name: Vielka Geiger  MRN: [id number]  Today's Date: 2024  Time Calculation  Start Time: 1307  Stop Time: 1345  Time Calculation (min): 38 min    Insurance   Visit number: 21 (3 of 10)   30 vs/yr (0 in )  Medicare Re-Certification Period: Beginnin23 - 24      Assessment:  Pt's progress has been limited by the holidays & travelling in car for prolonged periods.  She tolerated tx well & denied any increased pain afterwards. Anticipate pt. will see continued progress with regular HEP adherence, but they would still benefit from regular f/u's to monitor their progress & ensure appropriate progressions.       Plan:  OP PT Plan  Treatment/Interventions: Dry needling, Education/ Instruction, Electrical stimulation, Gait training, Manual therapy, Neuromuscular re-education, Self care/ home management, Therapeutic activities, Therapeutic exercises  PT Plan: Skilled PT  PT Frequency: 1 time per week  Certification Period Start Date: 23  Certification Period End Date: 24  Number of Treatments Authorized: 10  Rehab Potential: Good  Plan of Care Agreement: Patient      Current Problem  1. Chronic pain of both knees        2. Chronic pain of both ankles        3. Arthritis of both knees        4. Arthritis of both ankles                Subjective   Pt reports B hips & her low back are pretty sore from travelling for the holidays. Pt reports she tried to get out of the car every 90 min, but she was still stiff/sore.  Pt also reports she hasn't been sleeping well because of all the stress.      Objective   Observations:  pt. arrived to therapy ambulating IND but with decreased stance time on the L LE. Pt. was wearing B compression stockings (20-30 mmHg). Mod. edema, skin discoloration, & hardening noted B (R > L). Hypertonicity & tenderness also noted in the gastroc/soleus, peroneal, ant./post tibialis mm. Pt. also c/o increased pain along  "the B (R > L) malleoli (lateral > medial). Pt. c/o her 'familiar' L knee pain along the ITB, most notably at the distal insertion.      Manual Therapy (91241):  11 minutes  Soft tissue mobilization: superficial effleurage, petrissage, cross-friction, & mod. pressure to the L TFL, gluteals, quadriceps, & ITB, & B gastroc/soleus mm.  Active mm. pumping to the L quadriceps mm.  Joint mobs: supine B TC distraction (grade IV)    Appropriate force, direction, amplitude, & velocity for selected techniques to improve joint & soft tissue mobility & enhance ADL performance. Rationale & procedure given for above treatment techniques, & verbal consent was obtained prior to initiating treatment.       Dry Needling ([id number]):  5 minutes  Location (Needle length x Dosage):  - B gluteus medius (3\" x 5)  Type: basic insertion with needles left in situ + e-stim for mm. relaxation in B glute med   Response: no adverse events observed or reported    Pt. educated on theory & practical application of dry needling, as well as potential risks & benefits of needling, & additional verbal consent obtained prior to initiation of needling. Standard precautions, knowledge of appropriate benefits of treatment, & exclusion of relevant contraindications utilized.        Therapeutic Exercises ([id number]):  10 minutes  NuStep (level 6) x 10 min    Verbally reviewed but not actively performed today:   Walking x 200'  B LE calf raises  Walking program  Standing gastroc & soleus stretch  Firm / SLS / Hip ABD (each)  Hook-lying hip ABD iso's into belt  Hook-lying bridge  Seated lumbar flexion  Seated piriformis stretch (each)  Hook-lying TA + PPT    * = added to HEP. Sheet with exercise descriptions and images issued. Skilled intervention utilized in the appropriate selection & application of above exercises. Verbal and tactile cues provided for proper form and technique. Pt. demonstrated appropriate form & verbalized understanding of optimal technique for " above exercises.       Self Care / Home Management ([id number]):  12 minutes    Reviewed & updated current HEP:  B LE calf raises  Standing gastroc & soleus stretch  Firm / SLS / Hip ABD (each)  Hook-lying hip ABD iso's into belt  Seated piriformis stretch (each)  Hook-lying TA + PPT  Walking program    Large portion of pt. care time devoted to reviewing pt's current symptoms & determining optimal manual techniques with appropriate HEP modifications. Reviewed importance of regular HEP adherence, & stressed importance of proper form. Reviewed differences between post-exercises soreness vs. increased 'familiar’ pain, & reviewed appropriate progressions/regressions with exercises/activities based on symptoms. Discussed potential to attempt some aquatic therapy, but pt is resistant d/t vasomotor rhinitis.  Also discussed potential to obtain pedometer (i.e. FitBit, Apple watch, etc.) to better monitor total activity throughout the day.  Pt. verbalized understanding & agreement.

## 2024-01-11 ENCOUNTER — TREATMENT (OUTPATIENT)
Dept: PHYSICAL THERAPY | Facility: CLINIC | Age: 69
End: 2024-01-11
Payer: MEDICARE

## 2024-01-11 DIAGNOSIS — G89.29 CHRONIC PAIN OF BOTH ANKLES: ICD-10-CM

## 2024-01-11 DIAGNOSIS — G89.29 CHRONIC PAIN OF BOTH KNEES: Primary | ICD-10-CM

## 2024-01-11 DIAGNOSIS — M25.561 CHRONIC PAIN OF BOTH KNEES: Primary | ICD-10-CM

## 2024-01-11 DIAGNOSIS — M19.071 ARTHRITIS OF BOTH ANKLES: ICD-10-CM

## 2024-01-11 DIAGNOSIS — M19.072 ARTHRITIS OF BOTH ANKLES: ICD-10-CM

## 2024-01-11 DIAGNOSIS — M25.571 CHRONIC PAIN OF BOTH ANKLES: ICD-10-CM

## 2024-01-11 DIAGNOSIS — M25.562 CHRONIC PAIN OF BOTH KNEES: Primary | ICD-10-CM

## 2024-01-11 DIAGNOSIS — M17.0 ARTHRITIS OF BOTH KNEES: ICD-10-CM

## 2024-01-11 DIAGNOSIS — M25.572 CHRONIC PAIN OF BOTH ANKLES: ICD-10-CM

## 2024-01-11 PROCEDURE — 97140 MANUAL THERAPY 1/> REGIONS: CPT | Mod: GP

## 2024-01-11 PROCEDURE — 97535 SELF CARE MNGMENT TRAINING: CPT | Mod: GP

## 2024-01-11 NOTE — PROGRESS NOTES
Physical Therapy    Physical Therapy Treatment    Patient Name: Vielka Geiger  MRN: [id number]  Today's Date: 2024  Time Calculation  Start Time: 1435  Stop Time: 1515  Time Calculation (min): 40 min    Insurance   Visit number: 22 (4 of 10)   30 vs/yr (1 in )  Medicare Re-Certification Period: Beginnin23 - 24      Assessment:  Pt had a bit of a flare-up with her foot/ankle pain - she tolerated tx well & reported decreased pain afterwards.  Anticipate pt. will see continued progress with regular HEP adherence, but they would still benefit from regular f/u's to monitor their progress & ensure appropriate progressions.       Plan:  OP PT Plan  Treatment/Interventions: Dry needling, Education/ Instruction, Electrical stimulation, Gait training, Manual therapy, Neuromuscular re-education, Self care/ home management, Therapeutic activities, Therapeutic exercises  PT Plan: Skilled PT  PT Frequency: 1 time per week  Certification Period Start Date: 23  Certification Period End Date: 24  Number of Treatments Authorized: 10  Rehab Potential: Good  Plan of Care Agreement: Patient      Current Problem  1. Chronic pain of both knees        2. Chronic pain of both ankles        3. Arthritis of both knees        4. Arthritis of both ankles              Subjective   Pt reports her arms were a bit sore from doing the NuStep last week, but it generally was able to settle down.  She also reports her B ankles were a little aggravated this week for no specific reason.  She also had some big toe pain which she talked to her cardiologist about changing her meds.        Objective   Observations:  pt. arrived to therapy ambulating IND but with decreased stance time on the L LE. Pt. was wearing B compression stockings (20-30 mmHg). Mod. edema, skin discoloration, & hardening noted B (R > L). Hypertonicity & tenderness also noted in the gastroc/soleus, peroneal, ant./post tibialis mm. Pt. also c/o  "increased pain along the B (R > L) malleoli (lateral > medial). Pt. c/o her 'familiar' L knee pain along the ITB, most notably at the distal insertion.      Manual Therapy ([id number]):  20 minutes  Soft tissue mobilization: superficial effleurage, petrissage, cross-friction, & mod. pressure to the L TFL, gluteals, quadriceps, & ITB, & B gastroc/soleus mm.  Active mm. pumping to the L quadriceps mm.  Joint mobs: supine B TC distraction (grade IV)    Appropriate force, direction, amplitude, & velocity for selected techniques to improve joint & soft tissue mobility & enhance ADL performance. Rationale & procedure given for above treatment techniques, & verbal consent was obtained prior to initiating treatment.       Dry Needling ([id number]):  5 minutes  Location (Needle length x Dosage):  - B gluteus medius (3\" x 5)  Type: basic insertion with needles left in situ + e-stim for mm. relaxation in B glute med   Response: no adverse events observed or reported    Pt. educated on theory & practical application of dry needling, as well as potential risks & benefits of needling, & additional verbal consent obtained prior to initiation of needling. Standard precautions, knowledge of appropriate benefits of treatment, & exclusion of relevant contraindications utilized.        Therapeutic Exercises ([id number]):  0 minutes  NuStep (level 6) x 15 min - performed IND    Verbally reviewed but not actively performed today:   Walking x 200'  B LE calf raises  Walking program  Standing gastroc & soleus stretch  Firm / SLS / Hip ABD (each)  Hook-lying hip ABD iso's into belt  Hook-lying bridge  Seated lumbar flexion  Seated piriformis stretch (each)  Hook-lying TA + PPT    * = added to HEP. Sheet with exercise descriptions and images issued. Skilled intervention utilized in the appropriate selection & application of above exercises. Verbal and tactile cues provided for proper form and technique. Pt. demonstrated appropriate form & verbalized " understanding of optimal technique for above exercises.       Self Care / Home Management ([id number]):  15 minutes    Reviewed & updated current HEP:  B LE calf raises  Standing gastroc & soleus stretch  Firm / SLS / Hip ABD (each)  Hook-lying hip ABD iso's into belt  Seated piriformis stretch (each)  Hook-lying TA + PPT  Walking program    Large portion of pt. care time devoted to reviewing pt's current symptoms & determining optimal manual techniques with appropriate HEP modifications. Reviewed importance of regular HEP adherence, & stressed importance of proper form. Reviewed differences between post-exercises soreness vs. increased 'familiar’ pain, & reviewed appropriate progressions/regressions with exercises/activities based on symptoms. Discussed potential to attempt some aquatic therapy, but pt is resistant d/t vasomotor rhinitis.  Also discussed potential to obtain pedometer (i.e. FitBit, Apple watch, etc.) to better monitor total activity throughout the day.  Pt. verbalized understanding & agreement.

## 2024-01-17 ENCOUNTER — TREATMENT (OUTPATIENT)
Dept: PHYSICAL THERAPY | Facility: CLINIC | Age: 69
End: 2024-01-17
Payer: MEDICARE

## 2024-01-17 DIAGNOSIS — G89.29 CHRONIC PAIN OF BOTH KNEES: Primary | ICD-10-CM

## 2024-01-17 DIAGNOSIS — M19.071 ARTHRITIS OF BOTH ANKLES: ICD-10-CM

## 2024-01-17 DIAGNOSIS — M25.561 CHRONIC PAIN OF BOTH KNEES: Primary | ICD-10-CM

## 2024-01-17 DIAGNOSIS — M19.072 ARTHRITIS OF BOTH ANKLES: ICD-10-CM

## 2024-01-17 DIAGNOSIS — M25.571 CHRONIC PAIN OF BOTH ANKLES: ICD-10-CM

## 2024-01-17 DIAGNOSIS — M25.572 CHRONIC PAIN OF BOTH ANKLES: ICD-10-CM

## 2024-01-17 DIAGNOSIS — M25.562 CHRONIC PAIN OF BOTH KNEES: Primary | ICD-10-CM

## 2024-01-17 DIAGNOSIS — G89.29 CHRONIC PAIN OF BOTH ANKLES: ICD-10-CM

## 2024-01-17 DIAGNOSIS — M17.0 ARTHRITIS OF BOTH KNEES: ICD-10-CM

## 2024-01-17 PROCEDURE — 97535 SELF CARE MNGMENT TRAINING: CPT | Mod: GP

## 2024-01-17 PROCEDURE — 97140 MANUAL THERAPY 1/> REGIONS: CPT | Mod: GP

## 2024-01-17 PROCEDURE — 97110 THERAPEUTIC EXERCISES: CPT | Mod: GP

## 2024-01-17 NOTE — PROGRESS NOTES
Physical Therapy    Physical Therapy Treatment    Patient Name: Vielka Geiger  MRN: [id number]  Today's Date: 2024  Time Calculation  Start Time: 1406  Stop Time: 1505  Time Calculation (min): 59 min    Insurance   Visit number: 23 (5 of 10)   30 vs/yr (2 in )  Medicare Re-Certification Period: Beginnin23 - 24      Assessment:  Pt tolerated tx well & reported decreased pain afterwards.  She appears to be making slow improvements as she's tolerating longer duration with increased intensity on the NuStep & also reports improved activity tolerance with ADLs.  Anticipate pt's progress will continue to be slow, thais with all of the other personal stressors in her life, but she would still benefit from regular f/u's to monitor their progress & ensure appropriate progressions.       Plan:  OP PT Plan  Treatment/Interventions: Dry needling, Education/ Instruction, Electrical stimulation, Gait training, Manual therapy, Neuromuscular re-education, Self care/ home management, Therapeutic activities, Therapeutic exercises  PT Plan: Skilled PT  PT Frequency: 1 time per week  Certification Period Start Date: 23  Certification Period End Date: 24  Number of Treatments Authorized: 10  Rehab Potential: Good  Plan of Care Agreement: Patient      Current Problem  1. Chronic pain of both knees        2. Chronic pain of both ankles        3. Arthritis of both knees        4. Arthritis of both ankles                Subjective   Pt reports she felt pretty good since her last appt despite more stress.  States she was able to do a little more activity without increasing any symptoms.  Pt also reports her new BP meds have caused her to be more tired & more nauseated.  She reports her great toe is still little aggravated, but not any worse.        Objective   Observations:  pt. arrived to therapy ambulating IND but with decreased stance time on the L LE. Pt. was wearing B compression stockings (20-30 mmHg).  "Mod. edema, skin discoloration, & hardening noted B (R > L). Hypertonicity & tenderness also noted in the gastroc/soleus, peroneal, ant./post tibialis mm. Pt. also c/o increased pain along the B (R > L) malleoli (lateral > medial). Pt. c/o her 'familiar' L knee pain along the ITB, most notably at the distal insertion.      Manual Therapy ([id number]):  24 minutes  Soft tissue mobilization: superficial effleurage, petrissage, cross-friction, & mod. pressure to the L TFL, gluteals, quadriceps, & ITB, & B gastroc/soleus mm.  Active mm. pumping to the L quadriceps mm.  Joint mobs: supine B TC distraction (grade IV)    Appropriate force, direction, amplitude, & velocity for selected techniques to improve joint & soft tissue mobility & enhance ADL performance. Rationale & procedure given for above treatment techniques, & verbal consent was obtained prior to initiating treatment.       Dry Needling ([id number]):  5 minutes  Location (Needle length x Dosage):  - B gluteus medius (3\" x 5)  Type: basic insertion with needles left in situ + e-stim for mm. relaxation in B glute med   Response: no adverse events observed or reported    Pt. educated on theory & practical application of dry needling, as well as potential risks & benefits of needling, & additional verbal consent obtained prior to initiation of needling. Standard precautions, knowledge of appropriate benefits of treatment, & exclusion of relevant contraindications utilized.        Therapeutic Exercises ([id number]):  16 minutes  NuStep (level 6) x 16 min     Verbally reviewed but not actively performed today:   Walking x 200'  B LE calf raises  Walking program  Standing gastroc & soleus stretch  Firm / SLS / Hip ABD (each)  Hook-lying hip ABD iso's into belt  Hook-lying bridge  Seated lumbar flexion  Seated piriformis stretch (each)  Hook-lying TA + PPT    * = added to HEP. Sheet with exercise descriptions and images issued. Skilled intervention utilized in the appropriate " selection & application of above exercises. Verbal and tactile cues provided for proper form and technique. Pt. demonstrated appropriate form & verbalized understanding of optimal technique for above exercises.       Self Care / Home Management ([id number]):  14 minutes    Reviewed & updated current HEP:  B LE calf raises  Standing gastroc & soleus stretch  Firm / SLS / Hip ABD (each)  Hook-lying hip ABD iso's into belt  Seated piriformis stretch (each)  Hook-lying TA + PPT  Walking program    Large portion of pt. care time devoted to reviewing pt's current symptoms & determining optimal manual techniques with appropriate HEP modifications. Reviewed importance of regular HEP adherence, & stressed importance of proper form. Reviewed differences between post-exercises soreness vs. increased 'familiar’ pain, & reviewed appropriate progressions/regressions with exercises/activities based on symptoms. Discussed potential to attempt some aquatic therapy, but pt is resistant d/t vasomotor rhinitis.  Also discussed potential to obtain pedometer (i.e. FitBit, Apple watch, etc.) to better monitor total activity throughout the day.  Pt. verbalized understanding & agreement.

## 2024-01-25 ENCOUNTER — TREATMENT (OUTPATIENT)
Dept: PHYSICAL THERAPY | Facility: CLINIC | Age: 69
End: 2024-01-25
Payer: MEDICARE

## 2024-01-25 ENCOUNTER — TELEPHONE (OUTPATIENT)
Dept: PRIMARY CARE | Facility: CLINIC | Age: 69
End: 2024-01-25

## 2024-01-25 DIAGNOSIS — M25.562 CHRONIC PAIN OF BOTH KNEES: Primary | ICD-10-CM

## 2024-01-25 DIAGNOSIS — G89.29 CHRONIC PAIN OF BOTH KNEES: Primary | ICD-10-CM

## 2024-01-25 DIAGNOSIS — M25.572 CHRONIC PAIN OF BOTH ANKLES: ICD-10-CM

## 2024-01-25 DIAGNOSIS — M25.561 CHRONIC PAIN OF BOTH KNEES: Primary | ICD-10-CM

## 2024-01-25 DIAGNOSIS — E78.2 MIXED HYPERLIPIDEMIA: Primary | ICD-10-CM

## 2024-01-25 DIAGNOSIS — M19.071 ARTHRITIS OF BOTH ANKLES: ICD-10-CM

## 2024-01-25 DIAGNOSIS — M19.072 ARTHRITIS OF BOTH ANKLES: ICD-10-CM

## 2024-01-25 DIAGNOSIS — G89.29 CHRONIC PAIN OF BOTH ANKLES: ICD-10-CM

## 2024-01-25 DIAGNOSIS — M25.571 CHRONIC PAIN OF BOTH ANKLES: ICD-10-CM

## 2024-01-25 DIAGNOSIS — M17.0 ARTHRITIS OF BOTH KNEES: ICD-10-CM

## 2024-01-25 PROCEDURE — 97140 MANUAL THERAPY 1/> REGIONS: CPT | Mod: GP

## 2024-01-25 PROCEDURE — 97535 SELF CARE MNGMENT TRAINING: CPT | Mod: GP

## 2024-01-25 RX ORDER — SIMVASTATIN 40 MG/1
40 TABLET, FILM COATED ORAL NIGHTLY
Qty: 90 TABLET | Refills: 3 | Status: SHIPPED | OUTPATIENT
Start: 2024-01-25 | End: 2025-01-24

## 2024-01-25 NOTE — PROGRESS NOTES
Physical Therapy    Physical Therapy Treatment    Patient Name: Vielka Geiger  MRN: [id number]  Today's Date: 2024  Time Calculation  Start Time: 1436  Stop Time: 1516  Time Calculation (min): 40 min    Insurance   Visit number: 24 (6 of 10)   30 vs/yr (3 in )  Medicare Re-Certification Period: Beginnin23 - 24      Assessment:  Pt tolerated tx well & reported decreased pain afterwards.  She appears to be making slow improvements as she's tolerating longer duration with ADLs.  Anticipate pt's progress will continue to be slow, thais with all of the other personal stressors in her life, but she would still benefit from regular f/u's to monitor their progress & ensure appropriate progressions.       Plan:  OP PT Plan  Treatment/Interventions: Dry needling, Education/ Instruction, Electrical stimulation, Gait training, Manual therapy, Neuromuscular re-education, Self care/ home management, Therapeutic activities, Therapeutic exercises  PT Plan: Skilled PT  PT Frequency: 1 time per week  Certification Period Start Date: 23  Certification Period End Date: 24  Number of Treatments Authorized: 10  Rehab Potential: Good  Plan of Care Agreement: Patient      Current Problem  1. Chronic pain of both knees        2. Chronic pain of both ankles        3. Arthritis of both knees        4. Arthritis of both ankles              Subjective   Pt reports she 'turned' her L ankle while she was walking quickly outside in the rain yesterday.  States her hips have been a little better & she generally feels like she's making slow progress.      Objective   Observations:  pt. arrived to therapy ambulating IND but with decreased stance time on the L LE. Pt. was wearing B compression stockings (20-30 mmHg). Mod. edema, skin discoloration, & hardening noted B (R > L). Hypertonicity & tenderness also noted in the gastroc/soleus, peroneal, ant./post tibialis mm. Pt. also c/o increased pain along the B (R > L)  "malleoli (lateral > medial). Pt. c/o her 'familiar' L knee pain along the ITB, most notably at the distal insertion.      Manual Therapy ([id number]):  25 minutes  Soft tissue mobilization: superficial effleurage, petrissage, cross-friction, & mod. pressure to the L TFL, gluteals, quadriceps, & ITB, & B gastroc/soleus mm.  Active mm. pumping to the L quadriceps mm.  Joint mobs: supine B TC distraction (grade IV)    Appropriate force, direction, amplitude, & velocity for selected techniques to improve joint & soft tissue mobility & enhance ADL performance. Rationale & procedure given for above treatment techniques, & verbal consent was obtained prior to initiating treatment.       Dry Needling ([id number]):  5 minutes  Location (Needle length x Dosage):  - B gluteus medius (3\" x 5)  Type: basic insertion with needles left in situ + e-stim for mm. relaxation in B glute med   Response: no adverse events observed or reported    Pt. educated on theory & practical application of dry needling, as well as potential risks & benefits of needling, & additional verbal consent obtained prior to initiation of needling. Standard precautions, knowledge of appropriate benefits of treatment, & exclusion of relevant contraindications utilized.        Therapeutic Exercises ([id number]):  0 minutes  NuStep (level 6) x 15 min - completed IND    Verbally reviewed but not actively performed today:   Walking x 200'  B LE calf raises  Walking program  Standing gastroc & soleus stretch  Firm / SLS / Hip ABD (each)  Hook-lying hip ABD iso's into belt  Hook-lying bridge  Seated lumbar flexion  Seated piriformis stretch (each)  Hook-lying TA + PPT    * = added to HEP. Sheet with exercise descriptions and images issued. Skilled intervention utilized in the appropriate selection & application of above exercises. Verbal and tactile cues provided for proper form and technique. Pt. demonstrated appropriate form & verbalized understanding of optimal technique for " above exercises.       Self Care / Home Management ([id number]):  10 minutes    Reviewed & updated current HEP:  B LE calf raises  Standing gastroc & soleus stretch  Firm / SLS / Hip ABD (each)  Hook-lying hip ABD iso's into belt  Seated piriformis stretch (each)  Hook-lying TA + PPT  Walking program    Large portion of pt. care time devoted to reviewing pt's current symptoms & determining optimal manual techniques with appropriate HEP modifications. Reviewed importance of regular HEP adherence, & stressed importance of proper form. Reviewed differences between post-exercises soreness vs. increased 'familiar’ pain, & reviewed appropriate progressions/regressions with exercises/activities based on symptoms. Discussed potential to attempt some aquatic therapy, but pt is resistant d/t vasomotor rhinitis.  Also discussed potential to obtain pedometer (i.e. FitBit, Apple watch, etc.) to better monitor total activity throughout the day.  Pt. verbalized understanding & agreement.

## 2024-01-25 NOTE — TELEPHONE ENCOUNTER
Pt is requesting a refill of Simvastatin 40 mg to be sent to [facility].     [phone]

## 2024-01-31 DIAGNOSIS — I10 BENIGN ESSENTIAL HTN: ICD-10-CM

## 2024-01-31 RX ORDER — LOSARTAN POTASSIUM AND HYDROCHLOROTHIAZIDE 12.5; 1 MG/1; MG/1
1 TABLET ORAL DAILY
Qty: 90 TABLET | Refills: 1 | Status: SHIPPED | OUTPATIENT
Start: 2024-01-31

## 2024-01-31 RX ORDER — FUROSEMIDE 40 MG/1
40 TABLET ORAL DAILY
Qty: 90 TABLET | Refills: 1 | Status: SHIPPED | OUTPATIENT
Start: 2024-01-31

## 2024-02-01 ENCOUNTER — TREATMENT (OUTPATIENT)
Dept: PHYSICAL THERAPY | Facility: CLINIC | Age: 69
End: 2024-02-01
Payer: MEDICARE

## 2024-02-01 DIAGNOSIS — M25.562 CHRONIC PAIN OF BOTH KNEES: Primary | ICD-10-CM

## 2024-02-01 DIAGNOSIS — M25.561 CHRONIC PAIN OF BOTH KNEES: Primary | ICD-10-CM

## 2024-02-01 DIAGNOSIS — M25.572 CHRONIC PAIN OF BOTH ANKLES: ICD-10-CM

## 2024-02-01 DIAGNOSIS — G89.29 CHRONIC PAIN OF BOTH KNEES: Primary | ICD-10-CM

## 2024-02-01 DIAGNOSIS — G89.29 CHRONIC PAIN OF BOTH ANKLES: ICD-10-CM

## 2024-02-01 DIAGNOSIS — M19.071 ARTHRITIS OF BOTH ANKLES: ICD-10-CM

## 2024-02-01 DIAGNOSIS — M19.072 ARTHRITIS OF BOTH ANKLES: ICD-10-CM

## 2024-02-01 DIAGNOSIS — M17.0 ARTHRITIS OF BOTH KNEES: ICD-10-CM

## 2024-02-01 DIAGNOSIS — M25.571 CHRONIC PAIN OF BOTH ANKLES: ICD-10-CM

## 2024-02-01 PROCEDURE — 97535 SELF CARE MNGMENT TRAINING: CPT | Mod: GP

## 2024-02-01 PROCEDURE — 97140 MANUAL THERAPY 1/> REGIONS: CPT | Mod: GP

## 2024-02-01 NOTE — PROGRESS NOTES
Physical Therapy    Physical Therapy Treatment    Patient Name: Vielka Geiger  MRN: [id number]  Today's Date: 2024  Time Calculation  Start Time: 1432  Stop Time: 1515  Time Calculation (min): 43 min    Insurance   Visit number: 25 (7 of 10)   30 vs/yr (4 in )  Medicare Re-Certification Period: Beginnin23 - 24      Assessment:  Pt tolerated tx well & reported decreased pain afterwards.  She appears to be making slow improvements as she's tolerating longer duration with ADLs.  Anticipate pt's progress will continue to be slow, thais with all of the other personal stressors in her life, but she would still benefit from regular f/u's to monitor their progress & ensure appropriate progressions.       Plan:  OP PT Plan  Treatment/Interventions: Dry needling, Education/ Instruction, Electrical stimulation, Gait training, Manual therapy, Neuromuscular re-education, Self care/ home management, Therapeutic activities, Therapeutic exercises  PT Plan: Skilled PT  PT Frequency: 1 time per week  Certification Period Start Date: 23  Certification Period End Date: 24  Number of Treatments Authorized: 10  Rehab Potential: Good  Plan of Care Agreement: Patient      Current Problem  1. Chronic pain of both knees        2. Chronic pain of both ankles        3. Arthritis of both knees        4. Arthritis of both ankles              Subjective   Pt reports the ankles are getting better but are still a little irritating.  States she was able to do the NuStep for longer last week, but the L shoulder was a little more sore afterwards.      Objective   Observations:  pt. arrived to therapy ambulating IND but with decreased stance time on the L LE. Pt. was wearing B compression stockings (20-30 mmHg). Mod. edema, skin discoloration, & hardening noted B (R > L). Hypertonicity & tenderness also noted in the gastroc/soleus, peroneal, ant./post tibialis mm. Pt. also c/o increased pain along the B (R > L)  "malleoli (lateral > medial). Pt. c/o her 'familiar' L knee pain along the ITB, most notably at the distal insertion.      Manual Therapy (76417):  24 minutes  Soft tissue mobilization: superficial effleurage, petrissage, cross-friction, & mod. pressure to the L TFL, gluteals, quadriceps, & ITB, & B gastroc/soleus mm.  Active mm. pumping to the L quadriceps mm.  Joint mobs: supine B TC distraction (grade IV)    Appropriate force, direction, amplitude, & velocity for selected techniques to improve joint & soft tissue mobility & enhance ADL performance. Rationale & procedure given for above treatment techniques, & verbal consent was obtained prior to initiating treatment.       Dry Needling (26432):  5 minutes  Location (Needle length x Dosage):  - B gluteus medius (3\" x 5)  Type: basic insertion with needles left in situ + e-stim for mm. relaxation in B glute med   Response: no adverse events observed or reported    Pt. educated on theory & practical application of dry needling, as well as potential risks & benefits of needling, & additional verbal consent obtained prior to initiation of needling. Standard precautions, knowledge of appropriate benefits of treatment, & exclusion of relevant contraindications utilized.        Therapeutic Exercises ([id number]):  0 minutes  NuStep (level 6) x 15 min - completed IND    Verbally reviewed but not actively performed today:   Walking x 200'  B LE calf raises  Walking program  Standing gastroc & soleus stretch  Firm / SLS / Hip ABD (each)  Hook-lying hip ABD iso's into belt  Hook-lying bridge  Seated lumbar flexion  Seated piriformis stretch (each)  Hook-lying TA + PPT    * = added to HEP. Sheet with exercise descriptions and images issued. Skilled intervention utilized in the appropriate selection & application of above exercises. Verbal and tactile cues provided for proper form and technique. Pt. demonstrated appropriate form & verbalized understanding of optimal technique for " above exercises.       Self Care / Home Management (51123):  14 minutes    Reviewed & updated current HEP:  B LE calf raises  Standing gastroc & soleus stretch  Firm / SLS / Hip ABD (each)  Hook-lying hip ABD iso's into belt  Seated piriformis stretch (each)  Hook-lying TA + PPT  Walking program    Large portion of pt. care time devoted to reviewing pt's current symptoms & determining optimal manual techniques with appropriate HEP modifications. Reviewed importance of regular HEP adherence, & stressed importance of proper form. Reviewed differences between post-exercises soreness vs. increased 'familiar’ pain, & reviewed appropriate progressions/regressions with exercises/activities based on symptoms. Discussed potential to attempt some aquatic therapy, but pt is resistant d/t vasomotor rhinitis.  Also discussed potential to obtain pedometer (i.e. FitBit, Apple watch, etc.) to better monitor total activity throughout the day.  Pt. verbalized understanding & agreement.

## 2024-02-08 ENCOUNTER — TREATMENT (OUTPATIENT)
Dept: PHYSICAL THERAPY | Facility: CLINIC | Age: 69
End: 2024-02-08
Payer: MEDICARE

## 2024-02-08 DIAGNOSIS — G89.29 CHRONIC PAIN OF BOTH ANKLES: ICD-10-CM

## 2024-02-08 DIAGNOSIS — M25.562 CHRONIC PAIN OF BOTH KNEES: Primary | ICD-10-CM

## 2024-02-08 DIAGNOSIS — M25.561 CHRONIC PAIN OF BOTH KNEES: Primary | ICD-10-CM

## 2024-02-08 DIAGNOSIS — M25.572 CHRONIC PAIN OF BOTH ANKLES: ICD-10-CM

## 2024-02-08 DIAGNOSIS — G89.29 CHRONIC PAIN OF BOTH KNEES: Primary | ICD-10-CM

## 2024-02-08 DIAGNOSIS — M25.571 CHRONIC PAIN OF BOTH ANKLES: ICD-10-CM

## 2024-02-08 DIAGNOSIS — M17.0 ARTHRITIS OF BOTH KNEES: ICD-10-CM

## 2024-02-08 DIAGNOSIS — M19.071 ARTHRITIS OF BOTH ANKLES: ICD-10-CM

## 2024-02-08 DIAGNOSIS — M19.072 ARTHRITIS OF BOTH ANKLES: ICD-10-CM

## 2024-02-08 PROCEDURE — 97140 MANUAL THERAPY 1/> REGIONS: CPT | Mod: GP

## 2024-02-08 PROCEDURE — 97535 SELF CARE MNGMENT TRAINING: CPT | Mod: GP

## 2024-02-08 NOTE — PROGRESS NOTES
Physical Therapy    Physical Therapy Treatment    Patient Name: Vielka Geiger  MRN: [id number]  Today's Date: 2024  Time Calculation  Start Time: 1435  Stop Time: 1515  Time Calculation (min): 40 min    Insurance   Visit number: 26 (8 of 10)   30 vs/yr (5 in )  Medicare Re-Certification Period: Beginnin23 - 24      Assessment:  Pt presented with increased L hamstring pain today which was likely d/t mm soreness from increased activity.  She tolerated tx well & reported decreased pain afterwards.   Anticipate pt's progress will continue to be slow, thais with all of the other personal stressors in her life, but she would still benefit from regular f/u's to monitor their progress & ensure appropriate progressions.       Plan:  OP PT Plan  Treatment/Interventions: Dry needling, Education/ Instruction, Electrical stimulation, Gait training, Manual therapy, Neuromuscular re-education, Self care/ home management, Therapeutic activities, Therapeutic exercises  PT Plan: Skilled PT  PT Frequency: 1 time per week  Certification Period Start Date: 23  Certification Period End Date: 24  Number of Treatments Authorized: 10  Rehab Potential: Good  Plan of Care Agreement: Patient      Current Problem  1. Chronic pain of both knees        2. Chronic pain of both ankles        3. Arthritis of both knees        4. Arthritis of both ankles              Subjective   Pt reports her L hip, knee (thais hamstring) are a little more aggravated.  She notices it while she's walking for longer distances or attempting to wash the bottom of her L foot in the shower.        Objective   Observations:  pt. arrived to therapy ambulating IND but with decreased stance time on the L LE. Pt. was wearing B compression stockings (20-30 mmHg). Mod. edema, skin discoloration, & hardening noted B (R > L). Hypertonicity & tenderness also noted in the gastroc/soleus, peroneal, ant./post tibialis mm. Pt. also c/o increased pain  "along the B (R > L) malleoli (lateral > medial). Pt. c/o her 'familiar' L knee pain along the ITB, most notably at the distal insertion.      Manual Therapy (85261):  24 minutes  Soft tissue mobilization: superficial effleurage, petrissage, cross-friction, & mod. pressure to the L TFL, gluteals, quadriceps, & ITB, & B gastroc/soleus mm.  Active mm. pumping to the L quadriceps, TFL, & hamstring mm.  Joint mobs: supine B TC distraction (grade IV)    Appropriate force, direction, amplitude, & velocity for selected techniques to improve joint & soft tissue mobility & enhance ADL performance. Rationale & procedure given for above treatment techniques, & verbal consent was obtained prior to initiating treatment.       Dry Needling ([id number]):  6 minutes  Location (Needle length x Dosage):  - B gluteus medius (3\" x 3)  - B TFL (3\" x 2)  Type: basic insertion with needles left in situ + e-stim for mm. relaxation in B glute med   Response: no adverse events observed or reported    Pt. educated on theory & practical application of dry needling, as well as potential risks & benefits of needling, & additional verbal consent obtained prior to initiation of needling. Standard precautions, knowledge of appropriate benefits of treatment, & exclusion of relevant contraindications utilized.        Therapeutic Exercises (11587):  0 minutes  NuStep (level 6) x 15 min - completed IND    Verbally reviewed but not actively performed today:   Walking x 200'  B LE calf raises  Walking program  Standing gastroc & soleus stretch  Firm / SLS / Hip ABD (each)  Hook-lying hip ABD iso's into belt  Hook-lying bridge  Seated lumbar flexion  Seated piriformis stretch (each)  Hook-lying TA + PPT    * = added to HEP. Sheet with exercise descriptions and images issued. Skilled intervention utilized in the appropriate selection & application of above exercises. Verbal and tactile cues provided for proper form and technique. Pt. demonstrated appropriate " form & verbalized understanding of optimal technique for above exercises.       Self Care / Home Management ([id number]):  10 minutes    Reviewed & updated current HEP:  B LE calf raises  Standing gastroc & soleus stretch  Firm / SLS / Hip ABD (each)  Hook-lying hip ABD iso's into belt  Seated piriformis stretch (each)  Hook-lying TA + PPT  Walking program    Large portion of pt. care time devoted to reviewing pt's current symptoms & determining optimal manual techniques with appropriate HEP modifications. Reviewed importance of regular HEP adherence, & stressed importance of proper form. Reviewed differences between post-exercises soreness vs. increased 'familiar’ pain, & reviewed appropriate progressions/regressions with exercises/activities based on symptoms.  Pt. verbalized understanding & agreement.

## 2024-02-14 ENCOUNTER — TREATMENT (OUTPATIENT)
Dept: PHYSICAL THERAPY | Facility: CLINIC | Age: 69
End: 2024-02-14
Payer: MEDICARE

## 2024-02-14 DIAGNOSIS — G89.29 CHRONIC PAIN OF BOTH ANKLES: ICD-10-CM

## 2024-02-14 DIAGNOSIS — M25.572 CHRONIC PAIN OF BOTH ANKLES: ICD-10-CM

## 2024-02-14 DIAGNOSIS — M25.571 CHRONIC PAIN OF BOTH ANKLES: ICD-10-CM

## 2024-02-14 DIAGNOSIS — M19.071 ARTHRITIS OF BOTH ANKLES: ICD-10-CM

## 2024-02-14 DIAGNOSIS — M19.072 ARTHRITIS OF BOTH ANKLES: ICD-10-CM

## 2024-02-14 DIAGNOSIS — M25.561 CHRONIC PAIN OF BOTH KNEES: Primary | ICD-10-CM

## 2024-02-14 DIAGNOSIS — M17.0 ARTHRITIS OF BOTH KNEES: ICD-10-CM

## 2024-02-14 DIAGNOSIS — G89.29 CHRONIC PAIN OF BOTH KNEES: Primary | ICD-10-CM

## 2024-02-14 DIAGNOSIS — M25.562 CHRONIC PAIN OF BOTH KNEES: Primary | ICD-10-CM

## 2024-02-14 PROCEDURE — 97140 MANUAL THERAPY 1/> REGIONS: CPT | Mod: GP

## 2024-02-14 PROCEDURE — 97110 THERAPEUTIC EXERCISES: CPT | Mod: GP

## 2024-02-14 NOTE — PROGRESS NOTES
Physical Therapy    Physical Therapy Treatment    Patient Name: Vielka Geiger  MRN: [id number]  Today's Date: 2024  Time Calculation  Start Time: 1402  Stop Time: 1442  Time Calculation (min): 40 min    Insurance   Visit number: 27 (9 of 10)   30 vs/yr (6 in )  Medicare Re-Certification Period: Beginnin23 - 24      Assessment:  Pt's L LE pain appeared more related to distal ITB pain vs L hamstring pain, but she tolerated tx well & reported significantly decreased pain afterwards.  He symptoms still appear to be related to general deconditioning vs repetitive overuse & would benefit from a gradual return to exercises.  However, her progress has been limited by multiple stressful family events, including her brother on hospice in [place], [place], which is limiting her ability to participate fully in her HEP.  Pt would still benefit from regular f/u's to monitor her progress, decrease pain, & attempt to facilitate a gradual progression to more regular activity.      Plan:  OP PT Plan  Treatment/Interventions: Dry needling, Education/ Instruction, Electrical stimulation, Gait training, Manual therapy, Neuromuscular re-education, Self care/ home management, Therapeutic activities, Therapeutic exercises  PT Plan: Skilled PT  PT Frequency: 1 time per week  Certification Period Start Date: 23  Certification Period End Date: 24  Number of Treatments Authorized: 10  Rehab Potential: Good  Plan of Care Agreement: Patient      Current Problem  1. Chronic pain of both knees        2. Chronic pain of both ankles        3. Arthritis of both knees        4. Arthritis of both ankles            Subjective   Pt reports she feels better after last week, but she's still having a lot of pain in the L knee along the outside.  Pt is scheduled to fly to [place], [place] tomorrow to see her brother in hospice & voiced concerns about her symptoms.      Objective   Observations:  pt. arrived to therapy ambulating IND  "but with decreased stance time on the L LE. Pt. was wearing B compression stockings (20-30 mmHg). Mod. edema, skin discoloration, & hardening noted B (R > L). Hypertonicity & tenderness also noted in the gastroc/soleus, peroneal, ant./post tibialis mm. Pt. also c/o increased pain along the B (R > L) malleoli (lateral > medial). Pt. c/o her 'familiar' L knee pain along the ITB, most notably at the distal insertion.      Manual Therapy ([id number]):  20 minutes  Soft tissue mobilization: superficial effleurage, petrissage, cross-friction, & mod. pressure to the L TFL, gluteals, quadriceps, & ITB, & B gastroc/soleus mm.  Active mm. pumping to the L quadriceps, TFL, & hamstring mm.  Joint mobs: supine B TC distraction (grade IV)    Appropriate force, direction, amplitude, & velocity for selected techniques to improve joint & soft tissue mobility & enhance ADL performance. Rationale & procedure given for above treatment techniques, & verbal consent was obtained prior to initiating treatment.       Dry Needling ([id number]):  5 minutes  Location (Needle length x Dosage):  - B gluteus medius (3\" x 3)  - B TFL (3\" x 2)  Type: basic insertion with needles left in situ + e-stim for mm. relaxation in B glute med   Response: no adverse events observed or reported    Pt. educated on theory & practical application of dry needling, as well as potential risks & benefits of needling, & additional verbal consent obtained prior to initiation of needling. Standard precautions, knowledge of appropriate benefits of treatment, & exclusion of relevant contraindications utilized.        Therapeutic Exercises ([id number]):  10 minutes  NuStep (level 4) x 10 min - completed without UEs today    Verbally reviewed but not actively performed today:   Walking x 200'  B LE calf raises  Walking program  Standing gastroc & soleus stretch  Firm / SLS / Hip ABD (each)  Hook-lying hip ABD iso's into belt  Hook-lying bridge  Seated lumbar flexion  Seated piriformis " stretch (each)  Hook-lying TA + PPT    * = added to HEP. Sheet with exercise descriptions and images issued. Skilled intervention utilized in the appropriate selection & application of above exercises. Verbal and tactile cues provided for proper form and technique. Pt. demonstrated appropriate form & verbalized understanding of optimal technique for above exercises.       Self Care / Home Management (16033):  5 minutes    Reviewed & updated current HEP:  B LE calf raises  Standing gastroc & soleus stretch  Firm / SLS / Hip ABD (each)  Hook-lying hip ABD iso's into belt  Seated piriformis stretch (each)  Hook-lying TA + PPT  Walking program    Large portion of pt. care time devoted to reviewing pt's current symptoms & determining optimal manual techniques with appropriate HEP modifications. Reviewed importance of regular HEP adherence, & stressed importance of proper form. Reviewed differences between post-exercises soreness vs. increased 'familiar’ pain, & reviewed appropriate progressions/regressions with exercises/activities based on symptoms.  Pt. verbalized understanding & agreement.

## 2024-02-21 ENCOUNTER — TREATMENT (OUTPATIENT)
Dept: PHYSICAL THERAPY | Facility: CLINIC | Age: 69
End: 2024-02-21
Payer: MEDICARE

## 2024-02-21 DIAGNOSIS — M25.562 CHRONIC PAIN OF BOTH KNEES: Primary | ICD-10-CM

## 2024-02-21 DIAGNOSIS — M19.072 ARTHRITIS OF BOTH ANKLES: ICD-10-CM

## 2024-02-21 DIAGNOSIS — G89.29 CHRONIC PAIN OF BOTH ANKLES: ICD-10-CM

## 2024-02-21 DIAGNOSIS — G89.29 CHRONIC PAIN OF BOTH KNEES: Primary | ICD-10-CM

## 2024-02-21 DIAGNOSIS — M25.561 CHRONIC PAIN OF BOTH KNEES: Primary | ICD-10-CM

## 2024-02-21 DIAGNOSIS — M19.071 ARTHRITIS OF BOTH ANKLES: ICD-10-CM

## 2024-02-21 DIAGNOSIS — M25.572 CHRONIC PAIN OF BOTH ANKLES: ICD-10-CM

## 2024-02-21 DIAGNOSIS — M25.571 CHRONIC PAIN OF BOTH ANKLES: ICD-10-CM

## 2024-02-21 DIAGNOSIS — M17.0 ARTHRITIS OF BOTH KNEES: ICD-10-CM

## 2024-02-21 PROCEDURE — 97535 SELF CARE MNGMENT TRAINING: CPT | Mod: GP

## 2024-02-21 PROCEDURE — 97140 MANUAL THERAPY 1/> REGIONS: CPT | Mod: GP

## 2024-02-21 NOTE — LETTER
February 21, 2024    Wilber Dorsey PT  [address] [address]  [address]  [place] [address]    Patient: Vielka Geiger   YOB: 1955   Date of Visit: 2/21/2024       Dear No referring provider defined for this encounter.    The attached plan of care is being sent to you because your patient’s medical reimbursement requires that you certify the plan of care. Your signature is required to allow uninterrupted insurance coverage.      You may indicate your approval by signing below and faxing this form back to us at Dept Fax: [phone].    Please call Dept: [phone] with any questions or concerns.    Thank you for this referral,        Wilber Dorsey PT  [facility] [address] [address]  [address] [facility] [address]    Payer: Payor: MEDICARE / Plan: MEDICARE PART A AND B / Product Type: *No Product type* /                                                                         Date:     Dear Wilber Dorsey PT,     Re: Ms. Vielka Geiger, MRN:[id number]    I certify that I have reviewed the attached plan of care and it is medically necessary for Ms. Vielka Geiger (1955) who is under my care.          ______________________________________                    _________________  Provider name and credentials                                           Date and time                                                                                           Plan of Care 2/21/24   Effective from: 2/21/2024  Effective to: 5/21/2024    Plan ID: [id number]            Participants as of Finalize on 2/21/2024    Name Type Comments Contact Info    Wilber Dorsey PT Physical Therapist  [phone]    Lo Duran DO PCP - General  [phone]       Last Plan Note     Author: Wilber Dorsey PT Status: Incomplete Last edited: 2/21/2024  2:00 PM       Physical Therapy    Physical Therapy Treatment    Patient Name: Vielka Geiger  MRN: [id number]  Today's Date: 2/21/2024  Time  Calculation  Start Time: 1405  Stop Time: 1445  Time Calculation (min): 40 min    Insurance   Visit number: 28 (10 of 10)   30 vs/yr (7 in '24)  Medicare Re-Certification Period: Beginnin23 - 24      Assessment:  Overall the pt's progress has been somewhat more limited recently.  Pt has been dealing with several crises, including the recent passing of her older brother who had been in hospice.  Pt still demo'd several areas of hypertonicity & limited functional capacity, but the recent passing of her brother, & other comorbidities (including lymphedema) which are hindering her progress.  Pt would still benefit from regular f/u's to monitor her progress, decrease pain, & attempt to facilitate a gradual progression to more regular activity.      Plan:  OP PT Plan  Treatment/Interventions: Dry needling, Education/ Instruction, Electrical stimulation, Gait training, Manual therapy, Neuromuscular re-education, Self care/ home management, Therapeutic activities, Therapeutic exercises  PT Plan: Skilled PT  PT Frequency: 1 time per week  Certification Period Start Date: 24  Certification Period End Date: 24  Number of Treatments Authorized: 10  Rehab Potential: Good  Plan of Care Agreement: Patient      Current Problem  1. Chronic pain of both knees        2. Chronic pain of both ankles        3. Arthritis of both knees        4. Arthritis of both ankles            Subjective  Pt reports her brother passed away yesterday & she spent a lot of time by his bed side & flying in small plane seats over the past week.  States her L hip/groin feel more painful/sore, & B knee are hurting more, likely from the awkward positions/movements.  Overall the pt reports her progress has been more limited recently, but she's also been dealing with a lot stress on account her brother's health/recent passing.      Objective  Observations:  pt. arrived to therapy ambulating IND but with decreased stance time on the L  "LE. Pt. was wearing B compression stockings (20-30 mmHg). Mod. edema, skin discoloration, & hardening noted B (R > L). Hypertonicity & tenderness also noted in the gastroc/soleus, peroneal, ant./post tibialis mm. Pt. also c/o increased pain along the B (R > L) malleoli (lateral > medial). Pt. c/o her 'familiar' L knee pain along the ITB, most notably at the distal insertion.      Manual Therapy ([id number]):  20 minutes  Soft tissue mobilization: superficial effleurage, petrissage, cross-friction, & mod. pressure to the L TFL, gluteals, quadriceps, & ITB, & B gastroc/soleus mm.  Active mm. pumping to the L quadriceps, TFL, & hamstring mm.  Joint mobs: supine B TC distraction (grade IV)    Appropriate force, direction, amplitude, & velocity for selected techniques to improve joint & soft tissue mobility & enhance ADL performance. Rationale & procedure given for above treatment techniques, & verbal consent was obtained prior to initiating treatment.       Dry Needling (44114):  5 minutes  Location (Needle length x Dosage):  - B gluteus medius (3\" x 3)  - B TFL (3\" x 2)  Type: basic insertion with needles left in situ + e-stim for mm. relaxation in B glute med   Response: no adverse events observed or reported    Pt. educated on theory & practical application of dry needling, as well as potential risks & benefits of needling, & additional verbal consent obtained prior to initiation of needling. Standard precautions, knowledge of appropriate benefits of treatment, & exclusion of relevant contraindications utilized.        Therapeutic Exercises (79018):  0 minutes  NuStep (level 4) x 10 min - completed IND & without UEs today    Verbally reviewed but not actively performed today:   Walking x 200'  B LE calf raises  Walking program  Standing gastroc & soleus stretch  Firm / SLS / Hip ABD (each)  Hook-lying hip ABD iso's into belt  Hook-lying bridge  Seated lumbar flexion  Seated piriformis stretch (each)  Hook-lying TA + " PPT    * = added to HEP. Sheet with exercise descriptions and images issued. Skilled intervention utilized in the appropriate selection & application of above exercises. Verbal and tactile cues provided for proper form and technique. Pt. demonstrated appropriate form & verbalized understanding of optimal technique for above exercises.       Self Care / Home Management (55248):  15 minutes    Reviewed & updated current HEP:  B LE calf raises  Standing gastroc & soleus stretch  Firm / SLS / Hip ABD (each)  Hook-lying hip ABD iso's into belt  Seated piriformis stretch (each)  Hook-lying TA + PPT  Walking program    Large portion of pt. care time devoted to reviewing pt's current symptoms & determining optimal manual techniques with appropriate HEP modifications. Reviewed importance of regular HEP adherence, & stressed importance of proper form. Reviewed differences between post-exercises soreness vs. increased 'familiar’ pain, & reviewed appropriate progressions/regressions with exercises/activities based on symptoms.  Pt. verbalized understanding & agreement.               Current Participants as of 2/21/2024    Name Type Comments Contact Info    Wilber Dorsey PT Physical Therapist  [phone]    Signature pending    Lo Duran DO PCP - General  [phone]    Signature pending

## 2024-02-21 NOTE — PROGRESS NOTES
Physical Therapy    Physical Therapy Treatment    Patient Name: Vielka Geiger  MRN: [id number]  Today's Date: 2024  Time Calculation  Start Time: 1405  Stop Time: 1445  Time Calculation (min): 40 min    Insurance   Visit number: 28 (10 of 10)   30 vs/yr (7 in )  Medicare Re-Certification Period: Beginnin23 - 24      Assessment:  Overall the pt's progress has been somewhat more limited recently.  Pt has been dealing with several crises, including the recent passing of her older brother who had been in hospice.  Pt still demo'd several areas of hypertonicity & limited functional capacity, but the recent passing of her brother, & other comorbidities (including lymphedema) which are hindering her progress.  Pt would still benefit from regular f/u's to monitor her progress, decrease pain, & attempt to facilitate a gradual progression to more regular activity.      Plan:  OP PT Plan  Treatment/Interventions: Dry needling, Education/ Instruction, Electrical stimulation, Gait training, Manual therapy, Neuromuscular re-education, Self care/ home management, Therapeutic activities, Therapeutic exercises  PT Plan: Skilled PT  PT Frequency: 1 time per week  Certification Period Start Date: 24  Certification Period End Date: 24  Number of Treatments Authorized: 10  Rehab Potential: Good  Plan of Care Agreement: Patient      Current Problem  1. Chronic pain of both knees        2. Chronic pain of both ankles        3. Arthritis of both knees        4. Arthritis of both ankles            Subjective   Pt reports her brother passed away yesterday & she spent a lot of time by his bed side & flying in small plane seats over the past week.  States her L hip/groin feel more painful/sore, & B knee are hurting more, likely from the awkward positions/movements.  Overall the pt reports her progress has been more limited recently, but she's also been dealing with a lot stress on account her brother's  "health/recent passing.      Objective   Observations:  pt. arrived to therapy ambulating IND but with decreased stance time on the L LE. Pt. was wearing B compression stockings (20-30 mmHg). Mod. edema, skin discoloration, & hardening noted B (R > L). Hypertonicity & tenderness also noted in the gastroc/soleus, peroneal, ant./post tibialis mm. Pt. also c/o increased pain along the B (R > L) malleoli (lateral > medial). Pt. c/o her 'familiar' L knee pain along the ITB, most notably at the distal insertion.      Manual Therapy ([id number]):  20 minutes  Soft tissue mobilization: superficial effleurage, petrissage, cross-friction, & mod. pressure to the L TFL, gluteals, quadriceps, & ITB, & B gastroc/soleus mm.  Active mm. pumping to the L quadriceps, TFL, & hamstring mm.  Joint mobs: supine B TC distraction (grade IV)    Appropriate force, direction, amplitude, & velocity for selected techniques to improve joint & soft tissue mobility & enhance ADL performance. Rationale & procedure given for above treatment techniques, & verbal consent was obtained prior to initiating treatment.       Dry Needling ([id number]):  5 minutes  Location (Needle length x Dosage):  - B gluteus medius (3\" x 3)  - B TFL (3\" x 2)  Type: basic insertion with needles left in situ + e-stim for mm. relaxation in B glute med   Response: no adverse events observed or reported    Pt. educated on theory & practical application of dry needling, as well as potential risks & benefits of needling, & additional verbal consent obtained prior to initiation of needling. Standard precautions, knowledge of appropriate benefits of treatment, & exclusion of relevant contraindications utilized.        Therapeutic Exercises ([id number]):  0 minutes  NuStep (level 4) x 10 min - completed IND & without UEs today    Verbally reviewed but not actively performed today:   Walking x 200'  B LE calf raises  Walking program  Standing gastroc & soleus stretch  Firm / SLS / Hip ABD " (each)  Hook-lying hip ABD iso's into belt  Hook-lying bridge  Seated lumbar flexion  Seated piriformis stretch (each)  Hook-lying TA + PPT    * = added to HEP. Sheet with exercise descriptions and images issued. Skilled intervention utilized in the appropriate selection & application of above exercises. Verbal and tactile cues provided for proper form and technique. Pt. demonstrated appropriate form & verbalized understanding of optimal technique for above exercises.       Self Care / Home Management ([id number]):  15 minutes    Reviewed & updated current HEP:  B LE calf raises  Standing gastroc & soleus stretch  Firm / SLS / Hip ABD (each)  Hook-lying hip ABD iso's into belt  Seated piriformis stretch (each)  Hook-lying TA + PPT  Walking program    Large portion of pt. care time devoted to reviewing pt's current symptoms & determining optimal manual techniques with appropriate HEP modifications. Reviewed importance of regular HEP adherence, & stressed importance of proper form. Reviewed differences between post-exercises soreness vs. increased 'familiar’ pain, & reviewed appropriate progressions/regressions with exercises/activities based on symptoms.  Pt. verbalized understanding & agreement.

## 2024-02-26 ENCOUNTER — HOSPITAL ENCOUNTER (OUTPATIENT)
Dept: RADIOLOGY | Facility: CLINIC | Age: 69
Discharge: HOME | End: 2024-02-26
Payer: MEDICARE

## 2024-02-26 DIAGNOSIS — Z78.0 ASYMPTOMATIC MENOPAUSAL STATE: ICD-10-CM

## 2024-02-26 PROCEDURE — 77080 DXA BONE DENSITY AXIAL: CPT

## 2024-02-29 ENCOUNTER — TREATMENT (OUTPATIENT)
Dept: PHYSICAL THERAPY | Facility: CLINIC | Age: 69
End: 2024-02-29
Payer: MEDICARE

## 2024-02-29 DIAGNOSIS — M17.0 ARTHRITIS OF BOTH KNEES: ICD-10-CM

## 2024-02-29 DIAGNOSIS — M19.071 ARTHRITIS OF BOTH ANKLES: ICD-10-CM

## 2024-02-29 DIAGNOSIS — M25.572 CHRONIC PAIN OF BOTH ANKLES: ICD-10-CM

## 2024-02-29 DIAGNOSIS — M19.072 ARTHRITIS OF BOTH ANKLES: ICD-10-CM

## 2024-02-29 DIAGNOSIS — M25.571 CHRONIC PAIN OF BOTH ANKLES: ICD-10-CM

## 2024-02-29 DIAGNOSIS — M25.562 CHRONIC PAIN OF BOTH KNEES: Primary | ICD-10-CM

## 2024-02-29 DIAGNOSIS — G89.29 CHRONIC PAIN OF BOTH ANKLES: ICD-10-CM

## 2024-02-29 DIAGNOSIS — M25.561 CHRONIC PAIN OF BOTH KNEES: Primary | ICD-10-CM

## 2024-02-29 DIAGNOSIS — G89.29 CHRONIC PAIN OF BOTH KNEES: Primary | ICD-10-CM

## 2024-02-29 PROCEDURE — 97535 SELF CARE MNGMENT TRAINING: CPT | Mod: GP,KX

## 2024-02-29 PROCEDURE — 97140 MANUAL THERAPY 1/> REGIONS: CPT | Mod: GP,KX

## 2024-02-29 NOTE — PROGRESS NOTES
Physical Therapy    Physical Therapy Treatment    Patient Name: Vielka Geiger  MRN: [id number]  Today's Date: 2024  Time Calculation  Start Time: 1347  Stop Time: 1429  Time Calculation (min): 42 min    Insurance   Visit number: 29 (1 of 10)   30 vs/yr (8 in )  Medicare Re-Certification Period: Beginnin24 - 24      Assessment:  Pt has seen some slight improvement since her last f/u & reported decreased pain.  She tolerated tx well & reported decreased pain afterwards.  Pt would still benefit from regular f/u's to monitor her progress, decrease pain, & attempt to facilitate a gradual progression to more regular activity.      Plan:  OP PT Plan  Treatment/Interventions: Dry needling, Education/ Instruction, Electrical stimulation, Gait training, Manual therapy, Neuromuscular re-education, Self care/ home management, Therapeutic activities, Therapeutic exercises  PT Plan: Skilled PT  PT Frequency: 1 time per week  Certification Period Start Date: 24  Certification Period End Date: 24  Number of Treatments Authorized: 10  Rehab Potential: Good  Plan of Care Agreement: Patient      Current Problem  1. Chronic pain of both knees        2. Chronic pain of both ankles        3. Arthritis of both knees        4. Arthritis of both ankles              Subjective   Pt reports her HR has been really low & her BP really high recently, but this was likely d/t a change in meds - pt has a call into her cardiologist about her meds.  Pt also attributes the stress of her brother passing to her change in vitals.  Pt reports her MSK pain is better compared to last week, but she still c/o feeling 'unsteady' with 'weak ankles' with her walking.  Pt denies any instances of her ankles actually giving out, but she feels like they're close to that.        Objective   Observations:  pt. arrived to therapy ambulating IND but with decreased stance time on the L LE. Pt. was wearing B compression stockings (20-30  "mmHg). Mod. edema, skin discoloration, & hardening noted B (R > L). Hypertonicity & tenderness also noted in the gastroc/soleus, peroneal, ant./post tibialis mm. Pt. also c/o increased pain along the B (R > L) malleoli (lateral > medial). Pt. c/o her 'familiar' L knee pain along the ITB, most notably at the distal insertion.      Manual Therapy ([id number]):  25 minutes  Soft tissue mobilization: superficial effleurage, petrissage, cross-friction, & mod. pressure to the L TFL, gluteals, quadriceps, & ITB, & B gastroc/soleus mm.  Active mm. pumping to the L quadriceps, TFL, & hamstring mm.  Joint mobs: supine B TC distraction (grade IV)    Appropriate force, direction, amplitude, & velocity for selected techniques to improve joint & soft tissue mobility & enhance ADL performance. Rationale & procedure given for above treatment techniques, & verbal consent was obtained prior to initiating treatment.       Dry Needling ([id number]):  5 minutes  Location (Needle length x Dosage):  - B gluteus medius (3\" x 3)  - B TFL (3\" x 2)  Type: basic insertion with needles left in situ + e-stim for mm. relaxation in B glute med   Response: no adverse events observed or reported    Pt. educated on theory & practical application of dry needling, as well as potential risks & benefits of needling, & additional verbal consent obtained prior to initiation of needling. Standard precautions, knowledge of appropriate benefits of treatment, & exclusion of relevant contraindications utilized.        Therapeutic Exercises ([id number]):  0 minutes  NuStep (level 4) x 10 min - completed IND & without UEs today    Verbally reviewed but not actively performed today:   Walking x 200'  B LE calf raises  Walking program  Standing gastroc & soleus stretch  Firm / SLS / Hip ABD (each)  Hook-lying hip ABD iso's into belt  Hook-lying bridge  Seated lumbar flexion  Seated piriformis stretch (each)  Hook-lying TA + PPT    * = added to HEP. Sheet with exercise " descriptions and images issued. Skilled intervention utilized in the appropriate selection & application of above exercises. Verbal and tactile cues provided for proper form and technique. Pt. demonstrated appropriate form & verbalized understanding of optimal technique for above exercises.       Self Care / Home Management ([id number]):  12 minutes    Reviewed & updated current HEP:  B LE calf raises  Standing gastroc & soleus stretch  Firm / SLS / Hip ABD (each)  Hook-lying hip ABD iso's into belt  Seated piriformis stretch (each)  Hook-lying TA + PPT  Walking program    Large portion of pt. care time devoted to reviewing pt's current symptoms & determining optimal manual techniques with appropriate HEP modifications. Reviewed importance of regular HEP adherence, & stressed importance of proper form. Reviewed differences between post-exercises soreness vs. increased 'familiar’ pain, & reviewed appropriate progressions/regressions with exercises/activities based on symptoms.  Pt. verbalized understanding & agreement.

## 2024-03-07 ENCOUNTER — APPOINTMENT (OUTPATIENT)
Dept: PHYSICAL THERAPY | Facility: CLINIC | Age: 69
End: 2024-03-07
Payer: MEDICARE

## 2024-03-14 ENCOUNTER — TREATMENT (OUTPATIENT)
Dept: PHYSICAL THERAPY | Facility: CLINIC | Age: 69
End: 2024-03-14
Payer: MEDICARE

## 2024-03-14 DIAGNOSIS — M19.071 ARTHRITIS OF BOTH ANKLES: ICD-10-CM

## 2024-03-14 DIAGNOSIS — M25.561 CHRONIC PAIN OF BOTH KNEES: Primary | ICD-10-CM

## 2024-03-14 DIAGNOSIS — M19.072 ARTHRITIS OF BOTH ANKLES: ICD-10-CM

## 2024-03-14 DIAGNOSIS — G89.29 CHRONIC PAIN OF BOTH KNEES: Primary | ICD-10-CM

## 2024-03-14 DIAGNOSIS — G89.29 CHRONIC PAIN OF BOTH ANKLES: ICD-10-CM

## 2024-03-14 DIAGNOSIS — M25.571 CHRONIC PAIN OF BOTH ANKLES: ICD-10-CM

## 2024-03-14 DIAGNOSIS — M25.572 CHRONIC PAIN OF BOTH ANKLES: ICD-10-CM

## 2024-03-14 DIAGNOSIS — M17.0 ARTHRITIS OF BOTH KNEES: ICD-10-CM

## 2024-03-14 DIAGNOSIS — M25.562 CHRONIC PAIN OF BOTH KNEES: Primary | ICD-10-CM

## 2024-03-14 PROCEDURE — 97535 SELF CARE MNGMENT TRAINING: CPT | Mod: GP,KX

## 2024-03-14 PROCEDURE — 97140 MANUAL THERAPY 1/> REGIONS: CPT | Mod: GP,KX

## 2024-03-14 NOTE — PROGRESS NOTES
Physical Therapy    Physical Therapy Treatment    Patient Name: Vielka Geiger  MRN: [id number]  Today's Date: 3/14/2024  Time Calculation  Start Time: 1306  Stop Time: 1346  Time Calculation (min): 40 min    Insurance   Visit number: 30 (2 of 10)   30 vs/yr (9 in )  Medicare Re-Certification Period: Beginnin24 - 24      Assessment:  Pt has not made much progress since her last f/u but this is likely d/t other medical concerns.  She tolerated tx well & reported decreased pain afterwards.  Pt would still benefit from regular f/u's to monitor her progress, decrease pain, & attempt to facilitate a gradual progression to more regular activity.      Plan:  OP PT Plan  Treatment/Interventions: Dry needling, Education/ Instruction, Electrical stimulation, Gait training, Manual therapy, Neuromuscular re-education, Self care/ home management, Therapeutic activities, Therapeutic exercises  PT Plan: Skilled PT  PT Frequency: 1 time per week  Certification Period Start Date: 24  Certification Period End Date: 24  Number of Treatments Authorized: 10  Rehab Potential: Good  Plan of Care Agreement: Patient      Current Problem  1. Chronic pain of both knees        2. Chronic pain of both ankles        3. Arthritis of both knees        4. Arthritis of both ankles              Subjective   Pt reports she had B nostril infection which required a trip to the ED last weekend.  Pt had to cauterize the bleed which helped & she has another appt with an ENT in a few weeks.  Pt reports her foot was in an awkward position during the treatment which caused some pain & made it difficult to walk.  Pt reports this is getting better, but is still aggravated.  Pt reports her BP is still pretty high & she's been checking it regularly (HR is in the 50s low 60s which is better).      Objective   Observations:  pt. arrived to therapy ambulating IND but with decreased stance time on the L LE. Pt. was wearing B compression  "stockings (20-30 mmHg). Mod. edema, skin discoloration, & hardening noted B (R > L). Hypertonicity & tenderness also noted in the gastroc/soleus, peroneal, ant./post tibialis mm. Pt. also c/o increased pain along the B (R > L) malleoli (lateral > medial). Pt. c/o her 'familiar' L knee pain along the ITB, most notably at the distal insertion.      Manual Therapy ([id number]):  26 minutes  Soft tissue mobilization: superficial effleurage, petrissage, cross-friction, & mod. pressure to the L TFL, gluteals, quadriceps, & ITB, & B gastroc/soleus mm.  Active mm. pumping to the L quadriceps, TFL, & hamstring mm.  Joint mobs: supine B TC distraction (grade IV)    Appropriate force, direction, amplitude, & velocity for selected techniques to improve joint & soft tissue mobility & enhance ADL performance. Rationale & procedure given for above treatment techniques, & verbal consent was obtained prior to initiating treatment.       Dry Needling ([id number]):  5 minutes  Location (Needle length x Dosage):  - B gluteus medius (3\" x 3)  - B TFL (3\" x 2)  Type: basic insertion with needles left in situ + e-stim for mm. relaxation in B glute med   Response: no adverse events observed or reported    Pt. educated on theory & practical application of dry needling, as well as potential risks & benefits of needling, & additional verbal consent obtained prior to initiation of needling. Standard precautions, knowledge of appropriate benefits of treatment, & exclusion of relevant contraindications utilized.        Therapeutic Exercises ([id number]):  0 minutes  NuStep (level 4) x 10 min - completed IND & without UEs today    Verbally reviewed but not actively performed today:   Walking x 200'  B LE calf raises  Walking program  Standing gastroc & soleus stretch  Firm / SLS / Hip ABD (each)  Hook-lying hip ABD iso's into belt  Hook-lying bridge  Seated lumbar flexion  Seated piriformis stretch (each)  Hook-lying TA + PPT    * = added to HEP. Sheet with " exercise descriptions and images issued. Skilled intervention utilized in the appropriate selection & application of above exercises. Verbal and tactile cues provided for proper form and technique. Pt. demonstrated appropriate form & verbalized understanding of optimal technique for above exercises.       Self Care / Home Management ([id number]):  9 minutes    Reviewed & updated current HEP:  B LE calf raises  Standing gastroc & soleus stretch  Firm / SLS / Hip ABD (each)  Hook-lying hip ABD iso's into belt  Seated piriformis stretch (each)  Hook-lying TA + PPT  Walking program    Large portion of pt. care time devoted to reviewing pt's current symptoms & determining optimal manual techniques with appropriate HEP modifications. Reviewed importance of regular HEP adherence, & stressed importance of proper form. Reviewed differences between post-exercises soreness vs. increased 'familiar’ pain, & reviewed appropriate progressions/regressions with exercises/activities based on symptoms.  Pt. verbalized understanding & agreement.

## 2024-03-21 ENCOUNTER — TREATMENT (OUTPATIENT)
Dept: PHYSICAL THERAPY | Facility: CLINIC | Age: 69
End: 2024-03-21
Payer: MEDICARE

## 2024-03-21 DIAGNOSIS — M25.561 CHRONIC PAIN OF BOTH KNEES: Primary | ICD-10-CM

## 2024-03-21 DIAGNOSIS — G89.29 CHRONIC PAIN OF BOTH ANKLES: ICD-10-CM

## 2024-03-21 DIAGNOSIS — M17.0 ARTHRITIS OF BOTH KNEES: ICD-10-CM

## 2024-03-21 DIAGNOSIS — M19.071 ARTHRITIS OF BOTH ANKLES: ICD-10-CM

## 2024-03-21 DIAGNOSIS — M25.571 CHRONIC PAIN OF BOTH ANKLES: ICD-10-CM

## 2024-03-21 DIAGNOSIS — G89.29 CHRONIC PAIN OF BOTH KNEES: Primary | ICD-10-CM

## 2024-03-21 DIAGNOSIS — M25.562 CHRONIC PAIN OF BOTH KNEES: Primary | ICD-10-CM

## 2024-03-21 DIAGNOSIS — M25.572 CHRONIC PAIN OF BOTH ANKLES: ICD-10-CM

## 2024-03-21 DIAGNOSIS — M19.072 ARTHRITIS OF BOTH ANKLES: ICD-10-CM

## 2024-03-21 PROCEDURE — 97140 MANUAL THERAPY 1/> REGIONS: CPT | Mod: GP,KX

## 2024-03-21 PROCEDURE — 97535 SELF CARE MNGMENT TRAINING: CPT | Mod: GP,KX

## 2024-03-21 NOTE — PROGRESS NOTES
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  Pt. tolerated tx well & reported decreased pain afterwards.  Pt would still benefit from regular f/u's to monitor her progress, decrease pain, & attempt to facilitate a gradual progression to more regular activity.      Plan:  OP PT Plan  Treatment/Interventions: Dry needling, Education/ Instruction, Electrical stimulation, Gait training, Manual therapy, Neuromuscular re-education, Self care/ home management, Therapeutic activities, Therapeutic exercises  PT Plan: Skilled PT  PT Frequency: 1 time per week  Certification Period Start Date: 24  Certification Period End Date: 24  Number of Treatments Authorized: 10  Rehab Potential: Good  Plan of Care Agreement: Patient      Current Problem  1. Chronic pain of both knees        2. Chronic pain of both ankles        3. Arthritis of both knees        4. Arthritis of both ankles            Subjective   Pt reports her L ankle is still really weak & aggravated.  States her B hip & low back have also been aggravated.  States she's been doing the stretches & bridges which seem to help some.  Pt reports her new meds have caused her to gain some weight which are likely contributing to her symptoms.  Pt reports she still get winded with short distance walking (~100 steps).        Objective   Observations:  pt. arrived to therapy ambulating IND but with decreased stance time on the L LE. Pt.  "was wearing B compression stockings (20-30 mmHg). Mod. edema, skin discoloration, & hardening noted B (R > L). Hypertonicity & tenderness also noted in the gastroc/soleus, peroneal, ant./post tibialis mm. Pt. also c/o increased pain along the B (R > L) malleoli (lateral > medial). Pt. c/o her 'familiar' L knee pain along the ITB, most notably at the distal insertion.      Manual Therapy ([id number]):  35 minutes  Soft tissue mobilization: superficial effleurage, petrissage, cross-friction, & mod. pressure to the L TFL, gluteals, quadriceps, & ITB, & B gastroc/soleus mm.  Active mm. pumping to the L quadriceps, TFL, & hamstring mm.  Joint mobs: supine B TC distraction (grade IV)    Appropriate force, direction, amplitude, & velocity for selected techniques to improve joint & soft tissue mobility & enhance ADL performance. Rationale & procedure given for above treatment techniques, & verbal consent was obtained prior to initiating treatment.       Dry Needling ([id number]):  5 minutes  Location (Needle length x Dosage):  - B gluteus medius (3\" x 3)  - B TFL (3\" x 2)  Type: basic insertion with needles left in situ + e-stim for mm. relaxation in B glute med   Response: no adverse events observed or reported    Pt. educated on theory & practical application of dry needling, as well as potential risks & benefits of needling, & additional verbal consent obtained prior to initiation of needling. Standard precautions, knowledge of appropriate benefits of treatment, & exclusion of relevant contraindications utilized.        Therapeutic Exercises ([id number]):  0 minutes  NuStep (level 5) x 15 min - completed IND & without UEs today    Verbally reviewed but not actively performed today:   Walking x 200'  B LE calf raises  Walking program  Standing gastroc & soleus stretch  Firm / SLS / Hip ABD (each)  Hook-lying hip ABD iso's into belt  Hook-lying bridge  Seated lumbar flexion  Seated piriformis stretch (each)  Hook-lying TA + PPT    * = " added to HEP. Sheet with exercise descriptions and images issued. Skilled intervention utilized in the appropriate selection & application of above exercises. Verbal and tactile cues provided for proper form and technique. Pt. demonstrated appropriate form & verbalized understanding of optimal technique for above exercises.       Self Care / Home Management ([id number]):  15 minutes    Reviewed & updated current HEP:  B LE calf raises  Standing gastroc & soleus stretch  Firm / SLS / Hip ABD (each)  Hook-lying hip ABD iso's into belt  Seated piriformis stretch (each)  Hook-lying TA + PPT  Walking program    Large portion of pt. care time devoted to reviewing pt's current symptoms & determining optimal manual techniques with appropriate HEP modifications. Reviewed importance of regular HEP adherence, & stressed importance of proper form. Reviewed differences between post-exercises soreness vs. increased 'familiar’ pain, & reviewed appropriate progressions/regressions with exercises/activities based on symptoms.  Pt. verbalized understanding & agreement.

## 2024-03-27 ENCOUNTER — APPOINTMENT (OUTPATIENT)
Dept: PHYSICAL THERAPY | Facility: CLINIC | Age: 69
End: 2024-03-27
Payer: MEDICARE

## 2024-03-28 ENCOUNTER — APPOINTMENT (OUTPATIENT)
Dept: PHYSICAL THERAPY | Facility: CLINIC | Age: 69
End: 2024-03-28
Payer: MEDICARE

## 2024-04-10 ENCOUNTER — TREATMENT (OUTPATIENT)
Dept: PHYSICAL THERAPY | Facility: CLINIC | Age: 69
End: 2024-04-10
Payer: MEDICARE

## 2024-04-10 DIAGNOSIS — M25.572 CHRONIC PAIN OF BOTH ANKLES: ICD-10-CM

## 2024-04-10 DIAGNOSIS — M19.072 ARTHRITIS OF BOTH ANKLES: Primary | ICD-10-CM

## 2024-04-10 DIAGNOSIS — M25.561 CHRONIC PAIN OF BOTH KNEES: ICD-10-CM

## 2024-04-10 DIAGNOSIS — G89.29 CHRONIC PAIN OF BOTH KNEES: ICD-10-CM

## 2024-04-10 DIAGNOSIS — M25.562 CHRONIC PAIN OF BOTH KNEES: ICD-10-CM

## 2024-04-10 DIAGNOSIS — M19.071 ARTHRITIS OF BOTH ANKLES: Primary | ICD-10-CM

## 2024-04-10 DIAGNOSIS — M25.571 CHRONIC PAIN OF BOTH ANKLES: ICD-10-CM

## 2024-04-10 DIAGNOSIS — G89.29 CHRONIC PAIN OF BOTH ANKLES: ICD-10-CM

## 2024-04-10 DIAGNOSIS — M17.0 ARTHRITIS OF BOTH KNEES: ICD-10-CM

## 2024-04-10 PROCEDURE — 97140 MANUAL THERAPY 1/> REGIONS: CPT | Mod: GP

## 2024-04-10 PROCEDURE — 97535 SELF CARE MNGMENT TRAINING: CPT | Mod: GP

## 2024-04-10 PROCEDURE — 97110 THERAPEUTIC EXERCISES: CPT | Mod: GP

## 2024-04-10 NOTE — PROGRESS NOTES
Physical Therapy    Physical Therapy Treatment    Patient Name: Vielka Geiger  MRN: [id number]  Today's Date: 4/10/2024  Time Calculation  Start Time: 1445  Stop Time: 1540  Time Calculation (min): 55 min    Insurance   Visit number: 32 (4 of 10)   30 vs/yr (11 in )  Medicare Re-Certification Period: Beginnin24 - 24      Assessment:  Pt's progress continues to be limited by outside factors including ongoing cardiac issues which are causing some SOB & difficulty with increased activity.  Pt. tolerated tx well & reported decreased pain afterwards.  Pt would still benefit from regular f/u's to monitor her progress, decrease pain, & attempt to facilitate a gradual progression to more regular activity.      Plan:  OP PT Plan  Treatment/Interventions: Dry needling, Education/ Instruction, Electrical stimulation, Gait training, Manual therapy, Neuromuscular re-education, Self care/ home management, Therapeutic activities, Therapeutic exercises  PT Plan: Skilled PT  PT Frequency: 1 time per week  Certification Period Start Date: 24  Certification Period End Date: 24  Number of Treatments Authorized: 10  Rehab Potential: Good  Plan of Care Agreement: Patient      Current Problem  1. Arthritis of both ankles        2. Arthritis of both knees        3. Chronic pain of both ankles        4. Chronic pain of both knees            Subjective   Pt reports she's been doing okay but has been sitting a lot the past few days for meetings.  States whenever she sits for a while the back & hips still bother her & her BP/HR are still all over the place which has affected her ability to be active.       Objective   Observations:  pt. arrived to therapy ambulating IND but with decreased stance time on the L LE. Pt. was wearing B compression stockings (20-30 mmHg). Mod. edema, skin discoloration, & hardening noted B (R > L). Hypertonicity & tenderness also noted in the gastroc/soleus, peroneal, ant./post  "tibialis mm. Pt. also c/o increased pain along the B (R > L) malleoli (lateral > medial). Pt. c/o her 'familiar' L knee pain along the ITB, most notably at the distal insertion.      Manual Therapy ([id number]):  25 minutes  Soft tissue mobilization: superficial effleurage, petrissage, cross-friction, & mod. pressure to the L TFL, gluteals, quadriceps, & ITB, & B gastroc/soleus mm.  Active mm. pumping to the L quadriceps, TFL, & hamstring mm.  Joint mobs: supine B TC distraction (grade IV)    Appropriate force, direction, amplitude, & velocity for selected techniques to improve joint & soft tissue mobility & enhance ADL performance. Rationale & procedure given for above treatment techniques, & verbal consent was obtained prior to initiating treatment.       Dry Needling ([id number]):  5 minutes  Location (Needle length x Dosage):  - B gluteus medius (3\" x 3)  - B TFL (3\" x 2)  Type: basic insertion with needles left in situ + e-stim for mm. relaxation in B glute med   Response: no adverse events observed or reported    Pt. educated on theory & practical application of dry needling, as well as potential risks & benefits of needling, & additional verbal consent obtained prior to initiation of needling. Standard precautions, knowledge of appropriate benefits of treatment, & exclusion of relevant contraindications utilized.        Therapeutic Exercises ([id number]):  12 minutes  Walking on treadmill @ 1.2 - 1.7 mph x 10 minutes  -  HR:  64 - 82 bpm (intermittent drops from low 80's to mid 60's observed)  -  SpO2:  97-98% on RA  NuStep (level 5) x 15 min - completed IND & without UEs today    Verbally reviewed but not actively performed today:   Walking x 200'  B LE calf raises  Walking program  Standing gastroc & soleus stretch  Firm / SLS / Hip ABD (each)  Hook-lying hip ABD iso's into belt  Hook-lying bridge  Seated lumbar flexion  Seated piriformis stretch (each)  Hook-lying TA + PPT    * = added to HEP. Sheet with exercise " descriptions and images issued. Skilled intervention utilized in the appropriate selection & application of above exercises. Verbal and tactile cues provided for proper form and technique. Pt. demonstrated appropriate form & verbalized understanding of optimal technique for above exercises.       Self Care / Home Management ([id number]):  13 minutes    Reviewed & updated current HEP:  B LE calf raises  Standing gastroc & soleus stretch  Firm / SLS / Hip ABD (each)  Hook-lying hip ABD iso's into belt  Seated piriformis stretch (each)  Hook-lying TA + PPT  Walking program    Large portion of pt. care time devoted to reviewing pt's current symptoms & determining optimal manual techniques with appropriate HEP modifications. Reviewed importance of regular HEP adherence, & stressed importance of proper form. Reviewed differences between post-exercises soreness vs. increased 'familiar’ pain, & reviewed appropriate progressions/regressions with exercises/activities based on symptoms.  Pt. verbalized understanding & agreement.

## 2024-04-18 ENCOUNTER — TREATMENT (OUTPATIENT)
Dept: PHYSICAL THERAPY | Facility: CLINIC | Age: 69
End: 2024-04-18
Payer: MEDICARE

## 2024-04-18 DIAGNOSIS — G89.29 CHRONIC PAIN OF BOTH ANKLES: ICD-10-CM

## 2024-04-18 DIAGNOSIS — M19.071 ARTHRITIS OF BOTH ANKLES: ICD-10-CM

## 2024-04-18 DIAGNOSIS — M25.561 CHRONIC PAIN OF BOTH KNEES: Primary | ICD-10-CM

## 2024-04-18 DIAGNOSIS — M25.571 CHRONIC PAIN OF BOTH ANKLES: ICD-10-CM

## 2024-04-18 DIAGNOSIS — M17.0 ARTHRITIS OF BOTH KNEES: ICD-10-CM

## 2024-04-18 DIAGNOSIS — M25.572 CHRONIC PAIN OF BOTH ANKLES: ICD-10-CM

## 2024-04-18 DIAGNOSIS — G89.29 CHRONIC PAIN OF BOTH KNEES: Primary | ICD-10-CM

## 2024-04-18 DIAGNOSIS — M19.072 ARTHRITIS OF BOTH ANKLES: ICD-10-CM

## 2024-04-18 DIAGNOSIS — M25.562 CHRONIC PAIN OF BOTH KNEES: Primary | ICD-10-CM

## 2024-04-18 PROCEDURE — 97535 SELF CARE MNGMENT TRAINING: CPT | Mod: GP

## 2024-04-18 PROCEDURE — 97140 MANUAL THERAPY 1/> REGIONS: CPT | Mod: GP

## 2024-04-18 NOTE — PROGRESS NOTES
Physical Therapy    Physical Therapy Treatment    Patient Name: Vielka Geiger  MRN: [id number]  Today's Date: 2024  Time Calculation  Start Time: 1436  Stop Time: 1514  Time Calculation (min): 38 min    Insurance   Visit number: 33 (5 of 10)   30 vs/yr (12 in '24)  Medicare Re-Certification Period: Beginnin24 - 24      Assessment:  Pt's progress continues to be limited by outside factors although she was able to increase her activity this past week without significantly worsening symptoms.  Pt. tolerated tx well & reported decreased pain afterwards.  Pt would still benefit from regular f/u's to monitor her progress, decrease pain, & attempt to facilitate a gradual progression to more regular activity.      Plan:  OP PT Plan  Treatment/Interventions: Dry needling, Education/ Instruction, Electrical stimulation, Gait training, Manual therapy, Neuromuscular re-education, Self care/ home management, Therapeutic activities, Therapeutic exercises  PT Plan: Skilled PT  PT Frequency: 1 time per week  Certification Period Start Date: 24  Certification Period End Date: 24  Number of Treatments Authorized: 10  Rehab Potential: Good  Plan of Care Agreement: Patient      Current Problem  1. Chronic pain of both knees        2. Chronic pain of both ankles        3. Arthritis of both knees        4. Arthritis of both ankles            Subjective   Pt reports her B (L > R) ankles feel really weak today.  States the hips & back have been pretty sore, but she's been walking more & attributes it so some post-exercise soreness. Pt reports she had some soreness from the DN, but didn't notice a significant difference.          Objective   Observations:  pt. arrived to therapy ambulating IND but with decreased stance time on the L LE. Pt. was wearing B compression stockings (20-30 mmHg). Mod. edema, skin discoloration, & hardening noted B (R > L). Hypertonicity & tenderness also noted in the  "gastroc/soleus, peroneal, ant./post tibialis mm. Pt. also c/o increased pain along the B (R > L) malleoli (lateral > medial). Pt. c/o her 'familiar' L knee pain along the ITB, most notably at the distal insertion.      Manual Therapy ([id number]):  22 minutes  Soft tissue mobilization: superficial effleurage, petrissage, cross-friction, & mod. pressure to the L TFL, gluteals, quadriceps, & ITB, & B gastroc/soleus mm.  Active mm. pumping to the L quadriceps, TFL, & hamstring mm.  Joint mobs: supine B TC distraction (grade IV)    Appropriate force, direction, amplitude, & velocity for selected techniques to improve joint & soft tissue mobility & enhance ADL performance. Rationale & procedure given for above treatment techniques, & verbal consent was obtained prior to initiating treatment.       Dry Needling ([id number]):  6 minutes  Location (Needle length x Dosage):  - B gluteus medius (3\" x 3)  - B TFL (3\" x 2)  Type: basic insertion with needles left in situ + e-stim for mm. relaxation in B glute med   Response: no adverse events observed or reported    Pt. educated on theory & practical application of dry needling, as well as potential risks & benefits of needling, & additional verbal consent obtained prior to initiation of needling. Standard precautions, knowledge of appropriate benefits of treatment, & exclusion of relevant contraindications utilized.        Therapeutic Exercises ([id number]):  0 minutes  NuStep (level 5) x 15 min - completed IND     Verbally reviewed but not actively performed today:   Walking x 200'  B LE calf raises  Walking program  Standing gastroc & soleus stretch  Firm / SLS / Hip ABD (each)  Hook-lying hip ABD iso's into belt  Hook-lying bridge  Seated lumbar flexion  Seated piriformis stretch (each)  Hook-lying TA + PPT  Walking on treadmill @ 1.2 - 1.7 mph x 10 minutes  -  HR:  64 - 82 bpm (intermittent drops from low 80's to mid 60's observed)  -  SpO2:  97-98% on RA    * = added to HEP. Sheet with " exercise descriptions and images issued. Skilled intervention utilized in the appropriate selection & application of above exercises. Verbal and tactile cues provided for proper form and technique. Pt. demonstrated appropriate form & verbalized understanding of optimal technique for above exercises.       Self Care / Home Management ([id number]):  10 minutes    Reviewed & updated current HEP:  B LE calf raises  Standing gastroc & soleus stretch  Firm / SLS / Hip ABD (each)  Hook-lying hip ABD iso's into belt  Seated piriformis stretch (each)  Hook-lying TA + PPT  Walking program    Large portion of pt. care time devoted to reviewing pt's current symptoms & determining optimal manual techniques with appropriate HEP modifications. Reviewed importance of regular HEP adherence, & stressed importance of proper form. Reviewed differences between post-exercises soreness vs. increased 'familiar’ pain, & reviewed appropriate progressions/regressions with exercises/activities based on symptoms.  Pt. verbalized understanding & agreement.

## 2024-04-25 ENCOUNTER — TREATMENT (OUTPATIENT)
Dept: PHYSICAL THERAPY | Facility: CLINIC | Age: 69
End: 2024-04-25
Payer: MEDICARE

## 2024-04-25 DIAGNOSIS — G89.29 CHRONIC PAIN OF BOTH KNEES: ICD-10-CM

## 2024-04-25 DIAGNOSIS — M25.562 CHRONIC PAIN OF BOTH KNEES: ICD-10-CM

## 2024-04-25 DIAGNOSIS — M17.0 ARTHRITIS OF BOTH KNEES: ICD-10-CM

## 2024-04-25 DIAGNOSIS — M25.561 CHRONIC PAIN OF BOTH KNEES: ICD-10-CM

## 2024-04-25 DIAGNOSIS — M19.071 ARTHRITIS OF BOTH ANKLES: Primary | ICD-10-CM

## 2024-04-25 DIAGNOSIS — G89.29 CHRONIC PAIN OF BOTH ANKLES: ICD-10-CM

## 2024-04-25 DIAGNOSIS — M25.571 CHRONIC PAIN OF BOTH ANKLES: ICD-10-CM

## 2024-04-25 DIAGNOSIS — M25.572 CHRONIC PAIN OF BOTH ANKLES: ICD-10-CM

## 2024-04-25 DIAGNOSIS — M19.072 ARTHRITIS OF BOTH ANKLES: Primary | ICD-10-CM

## 2024-04-25 PROCEDURE — 97535 SELF CARE MNGMENT TRAINING: CPT | Mod: GP

## 2024-04-25 PROCEDURE — 97140 MANUAL THERAPY 1/> REGIONS: CPT | Mod: GP

## 2024-04-25 NOTE — PROGRESS NOTES
Physical Therapy    Physical Therapy Treatment    Patient Name: Vielka Geiger  MRN: [id number]  Today's Date: 2024  Time Calculation  Start Time: 1436  Stop Time: 1515  Time Calculation (min): 39 min    Insurance   Visit number: 34 (6 of 10)   30 vs/yr (13 in '24)  Medicare Re-Certification Period: Beginnin24 - 24      Assessment:  Pt's progress continues to be limited by outside factors although she was able to increase her activity this past week without significantly worsening symptoms.  Pt. tolerated tx well & reported decreased pain afterwards.  Pt would still benefit from regular f/u's to monitor her progress, decrease pain, & attempt to facilitate a gradual progression to more regular activity.      Plan:  OP PT Plan  Treatment/Interventions: Dry needling, Education/ Instruction, Electrical stimulation, Gait training, Manual therapy, Neuromuscular re-education, Self care/ home management, Therapeutic activities, Therapeutic exercises  PT Plan: Skilled PT  PT Frequency: 1 time per week  Certification Period Start Date: 24  Certification Period End Date: 24  Number of Treatments Authorized: 10  Rehab Potential: Good  Plan of Care Agreement: Patient      Current Problem  1. Arthritis of both ankles        2. Arthritis of both knees        3. Chronic pain of both ankles        4. Chronic pain of both knees              Subjective   Pt reports her L ankle was really bad earlier this week & rated the pain as 9-10/10.  States it's better now, but still rates her L ankle pain as 3-4/10.  Pt reports she was able to use the recumbent bike & then walk on the treadmill x 12 min with only mild soreness after her appt last week.  States she's been trying to walk more each day which may have contributed to the increased ankle pain.      Objective   Observations:  pt. arrived to therapy ambulating IND but with decreased stance time on the L LE. Pt. was wearing B compression stockings (20-30  "mmHg). Mod. edema, skin discoloration, & hardening noted B (R > L). Hypertonicity & tenderness also noted in the gastroc/soleus, peroneal, ant./post tibialis mm. Pt. also c/o increased pain along the B (R > L) malleoli (lateral > medial). Pt. c/o her 'familiar' L knee pain along the ITB, most notably at the distal insertion.      Manual Therapy ([id number]):  24 minutes  Soft tissue mobilization: superficial effleurage, petrissage, cross-friction, & mod. pressure to the L TFL, gluteals, quadriceps, & ITB, & B gastroc/soleus mm.  Active mm. pumping to the L quadriceps, TFL, & hamstring mm.  Joint mobs: supine B TC distraction (grade IV)    Appropriate force, direction, amplitude, & velocity for selected techniques to improve joint & soft tissue mobility & enhance ADL performance. Rationale & procedure given for above treatment techniques, & verbal consent was obtained prior to initiating treatment.       Dry Needling ([id number]):  5 minutes  Location (Needle length x Dosage):  - B gluteus medius (3\" x 3)  - B TFL (3\" x 2)  Type: basic insertion with needles left in situ + e-stim for mm. relaxation in B glute med   Response: no adverse events observed or reported    Pt. educated on theory & practical application of dry needling, as well as potential risks & benefits of needling, & additional verbal consent obtained prior to initiation of needling. Standard precautions, knowledge of appropriate benefits of treatment, & exclusion of relevant contraindications utilized.        Therapeutic Exercises ([id number]):  0 minutes  NuStep (level 5) x 15 min - completed IND   Walking on treadmill - completed IND    Verbally reviewed but not actively performed today:   Walking x 200'  B LE calf raises  Walking program  Standing gastroc & soleus stretch  Firm / SLS / Hip ABD (each)  Hook-lying hip ABD iso's into belt  Hook-lying bridge  Seated lumbar flexion  Seated piriformis stretch (each)  Hook-lying TA + PPT  Walking on treadmill @ 1.2 - 1.7 " mph x 10 minutes  -  HR:  64 - 82 bpm (intermittent drops from low 80's to mid 60's observed)  -  SpO2:  97-98% on RA    * = added to HEP. Sheet with exercise descriptions and images issued. Skilled intervention utilized in the appropriate selection & application of above exercises. Verbal and tactile cues provided for proper form and technique. Pt. demonstrated appropriate form & verbalized understanding of optimal technique for above exercises.       Self Care / Home Management ([id number]):  10 minutes    Reviewed & updated current HEP:  B LE calf raises  Standing gastroc & soleus stretch  Firm / SLS / Hip ABD (each)  Hook-lying hip ABD iso's into belt  Seated piriformis stretch (each)  Hook-lying TA + PPT  Walking program    Large portion of pt. care time devoted to reviewing pt's current symptoms & determining optimal manual techniques with appropriate HEP modifications. Reviewed importance of regular HEP adherence, & stressed importance of proper form. Reviewed differences between post-exercises soreness vs. increased 'familiar’ pain, & reviewed appropriate progressions/regressions with exercises/activities based on symptoms.  Pt. verbalized understanding & agreement.

## 2024-05-02 ENCOUNTER — APPOINTMENT (OUTPATIENT)
Dept: PHYSICAL THERAPY | Facility: CLINIC | Age: 69
End: 2024-05-02
Payer: MEDICARE

## 2024-05-09 ENCOUNTER — APPOINTMENT (OUTPATIENT)
Dept: PHYSICAL THERAPY | Facility: CLINIC | Age: 69
End: 2024-05-09
Payer: MEDICARE

## 2024-05-10 ENCOUNTER — TELEPHONE (OUTPATIENT)
Dept: PRIMARY CARE | Facility: CLINIC | Age: 69
End: 2024-05-10
Payer: MEDICARE

## 2024-05-10 NOTE — TELEPHONE ENCOUNTER
Pt called because she is having cold/sinus sx. She does have high blood pressure, and she is wondering if she can take anything for it or if a prescription can be sent.

## 2024-05-16 ENCOUNTER — TREATMENT (OUTPATIENT)
Dept: PHYSICAL THERAPY | Facility: CLINIC | Age: 69
End: 2024-05-16
Payer: MEDICARE

## 2024-05-16 DIAGNOSIS — M25.561 CHRONIC PAIN OF BOTH KNEES: Primary | ICD-10-CM

## 2024-05-16 DIAGNOSIS — M19.071 ARTHRITIS OF BOTH ANKLES: ICD-10-CM

## 2024-05-16 DIAGNOSIS — M25.562 CHRONIC PAIN OF BOTH KNEES: Primary | ICD-10-CM

## 2024-05-16 DIAGNOSIS — M19.072 ARTHRITIS OF BOTH ANKLES: ICD-10-CM

## 2024-05-16 DIAGNOSIS — M25.571 CHRONIC PAIN OF BOTH ANKLES: ICD-10-CM

## 2024-05-16 DIAGNOSIS — G89.29 CHRONIC PAIN OF BOTH KNEES: Primary | ICD-10-CM

## 2024-05-16 DIAGNOSIS — G89.29 CHRONIC PAIN OF BOTH ANKLES: ICD-10-CM

## 2024-05-16 DIAGNOSIS — M25.572 CHRONIC PAIN OF BOTH ANKLES: ICD-10-CM

## 2024-05-16 DIAGNOSIS — M17.0 ARTHRITIS OF BOTH KNEES: ICD-10-CM

## 2024-05-16 PROCEDURE — 97535 SELF CARE MNGMENT TRAINING: CPT | Mod: GP

## 2024-05-16 PROCEDURE — 97140 MANUAL THERAPY 1/> REGIONS: CPT | Mod: GP

## 2024-05-16 NOTE — PROGRESS NOTES
Physical Therapy    Physical Therapy Treatment    Patient Name: Vielka Geiger  MRN: [id number]  Today's Date: 2024  Time Calculation  Start Time: 1440  Stop Time: 1519  Time Calculation (min): 39 min    Insurance   Visit number: 35 (7 of 10)   30 vs/yr (14 in '24)  Medicare Re-Certification Period: Beginnin24 - 24      Assessment:  Pt's progress has been somewhat inconsistent - she was sick & then travelling over the past few weeks which affected her ability to be more active.  Additionally, she is still dealing with some personal/family issues which are likely increasing her stress & limiting her ability to engage in activity to the extent she would like.  Pt's symptoms have been similarly up/down & will likely be based on her ability to more consistently complete her HEP as prescribed.  Pt tolerated tx well & reported decreased knee/ankle pain afterwards.  Pt would still benefit from regular f/u's to monitor her progress & ensure appropriate progressions, but will plan to transition to IND management after the next few months d/t insurance limitations.      Plan:  OP PT Plan  Treatment/Interventions: Dry needling, Education/ Instruction, Electrical stimulation, Gait training, Manual therapy, Neuromuscular re-education, Self care/ home management, Therapeutic activities, Therapeutic exercises  PT Plan: Skilled PT  PT Frequency: 1 time per week  Certification Period Start Date: 24  Certification Period End Date: 24  Rehab Potential: Fair  Plan of Care Agreement: Patient      Current Problem  1. Chronic pain of both knees        2. Chronic pain of both ankles        3. Arthritis of both knees        4. Arthritis of both ankles              Subjective   Pt reports she was sick last week & was in [place] the week before which is why she hasn't been able to come in for follow-ups recently.  Pt reports the prolonged time in the car caused some increased pain & limited her activity.  Pt  "reports she's still getting short of breath with activity.  She reports the pain has been okay, but did a lot of walking [place] the art [place] with her  for their anniversary.        Objective   Observations:  pt. arrived to therapy ambulating IND but with decreased stance time on the L LE. Pt. was wearing B compression stockings (20-30 mmHg). Mod. edema, skin discoloration, & hardening noted B (R > L). Hypertonicity & tenderness also noted in the gastroc/soleus, peroneal, ant./post tibialis mm. Pt. also c/o increased pain along the B (R > L) malleoli (lateral > medial). Pt. c/o her 'familiar' L knee pain along the ITB, most notably at the distal insertion.      Manual Therapy ([id number]):  24 minutes  Soft tissue mobilization: superficial effleurage, petrissage, cross-friction, & mod. pressure to the L TFL, gluteals, quadriceps, & ITB, & B gastroc/soleus mm.  Active mm. pumping to the L quadriceps, TFL, & hamstring mm.  Joint mobs: supine B TC distraction (grade IV)    Appropriate force, direction, amplitude, & velocity for selected techniques to improve joint & soft tissue mobility & enhance ADL performance. Rationale & procedure given for above treatment techniques, & verbal consent was obtained prior to initiating treatment.       Dry Needling ([id number]):  5 minutes  Location (Needle length x Dosage):  - B gluteus medius (3\" x 3)  - B TFL (3\" x 2)  Type: basic insertion with needles left in situ + e-stim for mm. relaxation in B glute med   Response: no adverse events observed or reported    Pt. educated on theory & practical application of dry needling, as well as potential risks & benefits of needling, & additional verbal consent obtained prior to initiation of needling. Standard precautions, knowledge of appropriate benefits of treatment, & exclusion of relevant contraindications utilized.        Therapeutic Exercises ([id number]):  0 minutes  NuStep (level 5) x 15 min - completed IND   Walking on treadmill - " completed IND    Verbally reviewed but not actively performed today:   Walking x 200'  B LE calf raises  Walking program  Standing gastroc & soleus stretch  Firm / SLS / Hip ABD (each)  Hook-lying hip ABD iso's into belt  Hook-lying bridge  Seated lumbar flexion  Seated piriformis stretch (each)  Hook-lying TA + PPT  Walking on treadmill @ 1.2 - 1.7 mph x 10 minutes  -  HR:  64 - 82 bpm (intermittent drops from low 80's to mid 60's observed)  -  SpO2:  97-98% on RA    * = added to HEP. Sheet with exercise descriptions and images issued. Skilled intervention utilized in the appropriate selection & application of above exercises. Verbal and tactile cues provided for proper form and technique. Pt. demonstrated appropriate form & verbalized understanding of optimal technique for above exercises.       Self Care / Home Management ([id number]):  10 minutes    Reviewed & updated current HEP:  B LE calf raises  Standing gastroc & soleus stretch  Firm / SLS / Hip ABD (each)  Hook-lying hip ABD iso's into belt  Seated piriformis stretch (each)  Hook-lying TA + PPT  Walking program    Large portion of pt. care time devoted to reviewing pt's current symptoms & determining optimal manual techniques with appropriate HEP modifications. Reviewed importance of regular HEP adherence, & stressed importance of proper form. Reviewed differences between post-exercises soreness vs. increased 'familiar’ pain, & reviewed appropriate progressions/regressions with exercises/activities based on symptoms.  Pt. verbalized understanding & agreement.

## 2024-05-16 NOTE — LETTER
May 16, 2024    Wilber Dorsey PT  [address] [facility]  [address]  [facility] [address]    Patient: Vielka Geiger   YOB: 1955   Date of Visit: 5/16/2024       Dear No referring provider defined for this encounter.    The attached plan of care is being sent to you because your patient’s medical reimbursement requires that you certify the plan of care. Your signature is required to allow uninterrupted insurance coverage.      You may indicate your approval by signing below and faxing this form back to us at Dept Fax: [phone].    Please call Dept: [phone] with any questions or concerns.    Thank you for this referral,        Wilber Dorsey PT  [facility] [address] [facility]  [address] [facility] [id number]    Payer: Payor: MEDICARE / Plan: MEDICARE PART A AND B / Product Type: *No Product type* /                                                                         Date:     Dear Wilber Dorsey PT,     Re: Ms. Vielka Geiger, MRN:[id number]    I certify that I have reviewed the attached plan of care and it is medically necessary for Ms. Vielka Geiger (1955) who is under my care.          ______________________________________                    _________________  Provider name and credentials                                           Date and time                                                                                           Plan of Care 5/16/24   Effective from: 5/16/2024  Effective to: 8/14/2024    Plan ID: [id number]            Participants as of Finalize on 5/16/2024    Name Type Comments Contact Info    Wilber Dorsey PT Physical Therapist  [phone]    Lo Duran DO PCP - General  [phone]       Last Plan Note     Author: Wilber Dorsey PT Status: Incomplete Last edited: 5/16/2024  2:30 PM       Physical Therapy    Physical Therapy Treatment    Patient Name: Vielka Geiger  MRN: [id number]  Today's Date: 5/16/2024  Time  Calculation  Start Time: 1440  Stop Time: 1519  Time Calculation (min): 39 min    Insurance   Visit number: 35 (7 of 10)   30 vs/yr (13 in '24)  Medicare Re-Certification Period: Beginnin24 - 24      Assessment:  Pt's progress has been somewhat inconsistent - she was sick & then travelling over the past few weeks which affected her ability to be more active.  Additionally, she is still dealing with some personal/family issues which are likely increasing her stress & limiting her ability to engage in activity to the extent she would like.  Pt's symptoms have been similarly up/down & will likely be based on her ability to more consistently complete her HEP as prescribed.  Pt tolerated tx well & reported decreased knee/ankle pain afterwards.  Pt would still benefit from regular f/u's to monitor her progress & ensure appropriate progressions, but will plan to transition to IND management after the next few months d/t insurance limitations.      Plan:  OP PT Plan  Treatment/Interventions: Dry needling, Education/ Instruction, Electrical stimulation, Gait training, Manual therapy, Neuromuscular re-education, Self care/ home management, Therapeutic activities, Therapeutic exercises  PT Plan: Skilled PT  PT Frequency: 1 time per week  Certification Period Start Date: 24  Certification Period End Date: 24  Rehab Potential: Fair  Plan of Care Agreement: Patient      Current Problem  1. Chronic pain of both knees        2. Chronic pain of both ankles        3. Arthritis of both knees        4. Arthritis of both ankles              Subjective  Pt reports she was sick last week & was in [place] the week before which is why she hasn't been able to come in for follow-ups recently.  Pt reports the prolonged time in the car caused some increased pain & limited her activity.  Pt reports she's still getting short of breath with activity.  She reports the pain has been okay, but did a lot of walking in the art  "museum yesterday with her  for their anniversary.        Objective  Observations:  pt. arrived to therapy ambulating IND but with decreased stance time on the L LE. Pt. was wearing B compression stockings (20-30 mmHg). Mod. edema, skin discoloration, & hardening noted B (R > L). Hypertonicity & tenderness also noted in the gastroc/soleus, peroneal, ant./post tibialis mm. Pt. also c/o increased pain along the B (R > L) malleoli (lateral > medial). Pt. c/o her 'familiar' L knee pain along the ITB, most notably at the distal insertion.      Manual Therapy ([id number]):  24 minutes  Soft tissue mobilization: superficial effleurage, petrissage, cross-friction, & mod. pressure to the L TFL, gluteals, quadriceps, & ITB, & B gastroc/soleus mm.  Active mm. pumping to the L quadriceps, TFL, & hamstring mm.  Joint mobs: supine B TC distraction (grade IV)    Appropriate force, direction, amplitude, & velocity for selected techniques to improve joint & soft tissue mobility & enhance ADL performance. Rationale & procedure given for above treatment techniques, & verbal consent was obtained prior to initiating treatment.       Dry Needling ([id number]):  5 minutes  Location (Needle length x Dosage):  - B gluteus medius (3\" x 3)  - B TFL (3\" x 2)  Type: basic insertion with needles left in situ + e-stim for mm. relaxation in B glute med   Response: no adverse events observed or reported    Pt. educated on theory & practical application of dry needling, as well as potential risks & benefits of needling, & additional verbal consent obtained prior to initiation of needling. Standard precautions, knowledge of appropriate benefits of treatment, & exclusion of relevant contraindications utilized.        Therapeutic Exercises ([id number]):  0 minutes  NuStep (level 5) x 15 min - completed IND   Walking on treadmill - completed IND    Verbally reviewed but not actively performed today:   Walking x 200'  B LE calf raises  Walking program  Standing " gastroc & soleus stretch  Firm / SLS / Hip ABD (each)  Hook-lying hip ABD iso's into belt  Hook-lying bridge  Seated lumbar flexion  Seated piriformis stretch (each)  Hook-lying TA + PPT  Walking on treadmill @ 1.2 - 1.7 mph x 10 minutes  -  HR:  64 - 82 bpm (intermittent drops from low 80's to mid 60's observed)  -  SpO2:  97-98% on RA    * = added to HEP. Sheet with exercise descriptions and images issued. Skilled intervention utilized in the appropriate selection & application of above exercises. Verbal and tactile cues provided for proper form and technique. Pt. demonstrated appropriate form & verbalized understanding of optimal technique for above exercises.       Self Care / Home Management (55517):  10 minutes    Reviewed & updated current HEP:  B LE calf raises  Standing gastroc & soleus stretch  Firm / SLS / Hip ABD (each)  Hook-lying hip ABD iso's into belt  Seated piriformis stretch (each)  Hook-lying TA + PPT  Walking program    Large portion of pt. care time devoted to reviewing pt's current symptoms & determining optimal manual techniques with appropriate HEP modifications. Reviewed importance of regular HEP adherence, & stressed importance of proper form. Reviewed differences between post-exercises soreness vs. increased 'familiar’ pain, & reviewed appropriate progressions/regressions with exercises/activities based on symptoms.  Pt. verbalized understanding & agreement.            Current Participants as of 5/16/2024    Name Type Comments Contact Info    Wilber Dorsey, PT Physical Therapist  [phone]    Signature pending    Lo Duran DO PCP - General  [phone]    Signature pending

## 2024-05-23 ENCOUNTER — TREATMENT (OUTPATIENT)
Dept: PHYSICAL THERAPY | Facility: CLINIC | Age: 69
End: 2024-05-23
Payer: MEDICARE

## 2024-05-23 DIAGNOSIS — G89.29 CHRONIC PAIN OF BOTH KNEES: Primary | ICD-10-CM

## 2024-05-23 DIAGNOSIS — G89.29 CHRONIC PAIN OF BOTH ANKLES: ICD-10-CM

## 2024-05-23 DIAGNOSIS — M25.561 CHRONIC PAIN OF BOTH KNEES: Primary | ICD-10-CM

## 2024-05-23 DIAGNOSIS — M25.562 CHRONIC PAIN OF BOTH KNEES: Primary | ICD-10-CM

## 2024-05-23 DIAGNOSIS — M17.0 ARTHRITIS OF BOTH KNEES: ICD-10-CM

## 2024-05-23 DIAGNOSIS — M19.072 ARTHRITIS OF BOTH ANKLES: ICD-10-CM

## 2024-05-23 DIAGNOSIS — M25.571 CHRONIC PAIN OF BOTH ANKLES: ICD-10-CM

## 2024-05-23 DIAGNOSIS — M19.071 ARTHRITIS OF BOTH ANKLES: ICD-10-CM

## 2024-05-23 DIAGNOSIS — M25.572 CHRONIC PAIN OF BOTH ANKLES: ICD-10-CM

## 2024-05-23 PROCEDURE — 97535 SELF CARE MNGMENT TRAINING: CPT | Mod: GP

## 2024-05-23 PROCEDURE — 97140 MANUAL THERAPY 1/> REGIONS: CPT | Mod: GP

## 2024-05-23 NOTE — PROGRESS NOTES
Physical Therapy    Physical Therapy Treatment    Patient Name: Vielka Geiger  MRN: [id number]  Today's Date: 2024  Time Calculation  Start Time: 1438  Stop Time: 1518  Time Calculation (min): 40 min    Insurance   Visit number: 36 (8 of 10)   30 vs/yr (15 in )  Medicare Re-Certification Period: Beginnin24 - 24      Assessment:  Pt's progress continues to be somewhat inconsistent - she's been struggling with back pain which has been exacerbated by her allergies.  Additionally, her hips, knees, & ankles have all been irritable which have limited her ability to participate in her HEP & ADLs.  Pt tolerated tx well & reported decreased knee/ankle pain afterwards.  Pt would still benefit from regular f/u's to monitor her progress & ensure appropriate progressions, but will plan to transition to IND management after the next few months d/t insurance limitations.      Plan:  OP PT Plan  Treatment/Interventions: Dry needling, Education/ Instruction, Electrical stimulation, Gait training, Manual therapy, Neuromuscular re-education, Self care/ home management, Therapeutic activities, Therapeutic exercises  PT Plan: Skilled PT  PT Frequency: 1 time per week  Certification Period Start Date: 24  Certification Period End Date: 24  Rehab Potential: Fair  Plan of Care Agreement: Patient      Current Problem  1. Chronic pain of both knees        2. Chronic pain of both ankles        3. Arthritis of both knees        4. Arthritis of both ankles              Subjective   Pt reports she's been doing okay, but her back really tightened up this past week.  Pt also reports her hips, knees, & ankles have been really irritated.        Objective   Observations:  pt. arrived to therapy ambulating IND but with decreased stance time on the L LE. Pt. was wearing B compression stockings (20-30 mmHg). Mod. edema, skin discoloration, & hardening noted B (R > L). Hypertonicity & tenderness also noted in the  "gastroc/soleus, peroneal, ant./post tibialis mm. Pt. also c/o increased pain along the B (R > L) malleoli (lateral > medial). Pt. c/o her 'familiar' L knee pain along the ITB, most notably at the distal insertion.      Manual Therapy ([id number]):  25 minutes  Soft tissue mobilization: superficial effleurage, petrissage, cross-friction, & mod. pressure to the L TFL, gluteals, quadriceps, & ITB, & B gastroc/soleus mm.  Active mm. pumping to the L quadriceps, TFL, & hamstring mm.  Joint mobs: supine B TC distraction (grade IV)    Appropriate force, direction, amplitude, & velocity for selected techniques to improve joint & soft tissue mobility & enhance ADL performance. Rationale & procedure given for above treatment techniques, & verbal consent was obtained prior to initiating treatment.       Dry Needling ([id number]):  5 minutes  Location (Needle length x Dosage):  - B gluteus medius (3\" x 3)  - B TFL (3\" x 2)  Type: basic insertion with needles left in situ + e-stim for mm. relaxation in B glute med   Response: no adverse events observed or reported    Pt. educated on theory & practical application of dry needling, as well as potential risks & benefits of needling, & additional verbal consent obtained prior to initiation of needling. Standard precautions, knowledge of appropriate benefits of treatment, & exclusion of relevant contraindications utilized.        Therapeutic Exercises ([id number]):  0 minutes  NuStep (level 5) x 15 min - completed IND   Walking on treadmill - completed IND    Verbally reviewed but not actively performed today:   Walking x 200'  B LE calf raises  Walking program  Standing gastroc & soleus stretch  Firm / SLS / Hip ABD (each)  Hook-lying hip ABD iso's into belt  Hook-lying bridge  Seated lumbar flexion  Seated piriformis stretch (each)  Hook-lying TA + PPT  Walking on treadmill @ 1.2 - 1.7 mph x 10 minutes  -  HR:  64 - 82 bpm (intermittent drops from low 80's to mid 60's observed)  -  SpO2:  97-98% " on RA    * = added to HEP. Sheet with exercise descriptions and images issued. Skilled intervention utilized in the appropriate selection & application of above exercises. Verbal and tactile cues provided for proper form and technique. Pt. demonstrated appropriate form & verbalized understanding of optimal technique for above exercises.       Self Care / Home Management ([id number]):  10 minutes    Reviewed & updated current HEP:  B LE calf raises  Standing gastroc & soleus stretch  Firm / SLS / Hip ABD (each)  Hook-lying hip ABD iso's into belt  Seated piriformis stretch (each)  Hook-lying TA + PPT  Walking program    Large portion of pt. care time devoted to reviewing pt's current symptoms & determining optimal manual techniques with appropriate HEP modifications. Reviewed importance of regular HEP adherence, & stressed importance of proper form. Reviewed differences between post-exercises soreness vs. increased 'familiar’ pain, & reviewed appropriate progressions/regressions with exercises/activities based on symptoms.  Pt. verbalized understanding & agreement.

## 2024-05-30 ENCOUNTER — TREATMENT (OUTPATIENT)
Dept: PHYSICAL THERAPY | Facility: CLINIC | Age: 69
End: 2024-05-30
Payer: MEDICARE

## 2024-05-30 DIAGNOSIS — M19.072 ARTHRITIS OF BOTH ANKLES: ICD-10-CM

## 2024-05-30 DIAGNOSIS — M25.571 CHRONIC PAIN OF BOTH ANKLES: ICD-10-CM

## 2024-05-30 DIAGNOSIS — G89.29 CHRONIC PAIN OF BOTH KNEES: Primary | ICD-10-CM

## 2024-05-30 DIAGNOSIS — M25.562 CHRONIC PAIN OF BOTH KNEES: Primary | ICD-10-CM

## 2024-05-30 DIAGNOSIS — M25.561 CHRONIC PAIN OF BOTH KNEES: Primary | ICD-10-CM

## 2024-05-30 DIAGNOSIS — M19.071 ARTHRITIS OF BOTH ANKLES: ICD-10-CM

## 2024-05-30 DIAGNOSIS — M17.0 ARTHRITIS OF BOTH KNEES: ICD-10-CM

## 2024-05-30 DIAGNOSIS — M25.572 CHRONIC PAIN OF BOTH ANKLES: ICD-10-CM

## 2024-05-30 DIAGNOSIS — G89.29 CHRONIC PAIN OF BOTH ANKLES: ICD-10-CM

## 2024-05-30 PROCEDURE — 97535 SELF CARE MNGMENT TRAINING: CPT | Mod: GP

## 2024-05-30 PROCEDURE — 97140 MANUAL THERAPY 1/> REGIONS: CPT | Mod: GP

## 2024-05-30 NOTE — PROGRESS NOTES
Physical Therapy    Physical Therapy Treatment    Patient Name: Vielka Geiger  MRN: [id number]  Today's Date: 2024  Time Calculation  Start Time: 1440  Stop Time: 1523  Time Calculation (min): 43 min    Insurance   Visit number: 37 (9 of 10)   30 vs/yr (15 in '24)  Medicare Re-Certification Period: Beginnin24 - 24      Assessment:  Pt returned to therapy with acute onset L ankle pain which appeared consistent with post tib & peroneal tendonitis.  She tolerated tx well & reported decreased pain afterwards.  Anticipate her acute symptoms will return to baseline over the next few day, but she would still benefit from regular f/u's to monitor her progress & ensure appropriate progressions.  Will need to perform 10th visit reassessment at next f/u.        Plan:  OP PT Plan  Treatment/Interventions: Dry needling, Education/ Instruction, Electrical stimulation, Gait training, Manual therapy, Neuromuscular re-education, Self care/ home management, Therapeutic activities, Therapeutic exercises  PT Plan: Skilled PT  PT Frequency: 1 time per week  Certification Period Start Date: 24  Certification Period End Date: 24  Rehab Potential: Fair  Plan of Care Agreement: Patient      Current Problem  1. Chronic pain of both knees        2. Chronic pain of both ankles        3. Arthritis of both knees        4. Arthritis of both ankles              Subjective   Pt reports earlier this week she was on her feet a lot, but started having increased pain in the L medial ankle.  Pt reports her ankle pain became progressively worse & she couldn't do FWB on the L LE.  She was using crutches to get around for the next couple days & needed to stay off it & elevate as much as possible.  She was able to progress to ambulating with a cane by Wed evening, but it's still causing increased pain.  Pt rates her L ankle pain as 6/10 currently.        Objective   Observations:  pt. arrived to therapy ambulating with SPC  in the R UE & still demo'd decreased stance time on the L LE. Pt. was wearing B compression stockings (20-30 mmHg). Mod. edema, skin discoloration, & hardening noted B (R > L). Hypertonicity & tenderness also noted in the gastroc/soleus, peroneal, ant./post tibialis mm. Pt. also c/o increased pain along the B (R > L) malleoli (lateral > medial). Pt. c/o her 'familiar' L knee pain along the ITB, most notably at the distal insertion.      Manual Therapy ([id number]):  25 minutes  Soft tissue mobilization: superficial effleurage, petrissage, cross-friction, & light pressure to the L gastroc/soleus, post tib, peroneals, & foot intrinsic mm.  Joint mobs: supine L TC distraction, L mid foot IR (grade III)    Appropriate force, direction, amplitude, & velocity for selected techniques to improve joint & soft tissue mobility & enhance ADL performance. Rationale & procedure given for above treatment techniques, & verbal consent was obtained prior to initiating treatment.       Dry Needling ([id number]):  5 minutes  Location (Needle length x Dosage):  - L medial / lateral gastroc (50 mm x 1)  - L post tibi (50 mm x 1)  - L peroneus longus (50 mm x 1)    Type: basic insertion with needles left in situ + e-stim for mm. relaxation in B glute med   Response: no adverse events observed or reported    Pt. educated on theory & practical application of dry needling, as well as potential risks & benefits of needling, & additional verbal consent obtained prior to initiation of needling. Standard precautions, knowledge of appropriate benefits of treatment, & exclusion of relevant contraindications utilized.        Therapeutic Exercises ([id number]):  0 minutes  NuStep (level 5) x 15 min - not performed today  Walking on treadmill - not performed today    Verbally reviewed but not actively performed today:   Walking x 200'  B LE calf raises  Walking program  Standing gastroc & soleus stretch  Firm / SLS / Hip ABD (each)  Hook-lying hip ABD iso's into  belt  Hook-lying bridge  Seated lumbar flexion  Seated piriformis stretch (each)  Hook-lying TA + PPT  Walking on treadmill @ 1.2 - 1.7 mph x 10 minutes  -  HR:  64 - 82 bpm (intermittent drops from low 80's to mid 60's observed)  -  SpO2:  97-98% on RA    * = added to HEP. Sheet with exercise descriptions and images issued. Skilled intervention utilized in the appropriate selection & application of above exercises. Verbal and tactile cues provided for proper form and technique. Pt. demonstrated appropriate form & verbalized understanding of optimal technique for above exercises.       Self Care / Home Management (26511):  13 minutes    Reviewed & updated current HEP:  B LE calf raises  Standing gastroc & soleus stretch  Firm / SLS / Hip ABD (each)  Hook-lying hip ABD iso's into belt  Seated piriformis stretch (each)  Hook-lying TA + PPT  Walking program    Large portion of pt. care time devoted to reviewing pt's current symptoms & determining optimal manual techniques with appropriate HEP modifications. Reviewed importance of regular HEP adherence, & stressed importance of proper form. Reviewed differences between post-exercises soreness vs. increased 'familiar’ pain, & reviewed appropriate progressions/regressions with exercises/activities based on symptoms.  Pt. verbalized understanding & agreement.

## 2024-06-04 ENCOUNTER — HOSPITAL ENCOUNTER (OUTPATIENT)
Dept: RADIOLOGY | Facility: CLINIC | Age: 69
Discharge: HOME | End: 2024-06-04
Payer: MEDICARE

## 2024-06-04 ENCOUNTER — TREATMENT (OUTPATIENT)
Dept: PHYSICAL THERAPY | Facility: CLINIC | Age: 69
End: 2024-06-04
Payer: MEDICARE

## 2024-06-04 DIAGNOSIS — M25.562 CHRONIC PAIN OF BOTH KNEES: Primary | ICD-10-CM

## 2024-06-04 DIAGNOSIS — M25.571 CHRONIC PAIN OF BOTH ANKLES: ICD-10-CM

## 2024-06-04 DIAGNOSIS — M25.551 HIP PAIN, BILATERAL: ICD-10-CM

## 2024-06-04 DIAGNOSIS — G89.29 CHRONIC PAIN OF BOTH ANKLES: ICD-10-CM

## 2024-06-04 DIAGNOSIS — M25.561 CHRONIC PAIN OF BOTH KNEES: Primary | ICD-10-CM

## 2024-06-04 DIAGNOSIS — M19.072 ARTHRITIS OF BOTH ANKLES: ICD-10-CM

## 2024-06-04 DIAGNOSIS — G89.29 CHRONIC PAIN OF BOTH KNEES: Primary | ICD-10-CM

## 2024-06-04 DIAGNOSIS — M19.071 ARTHRITIS OF BOTH ANKLES: ICD-10-CM

## 2024-06-04 DIAGNOSIS — M25.552 HIP PAIN, BILATERAL: ICD-10-CM

## 2024-06-04 DIAGNOSIS — M25.572 CHRONIC PAIN OF BOTH ANKLES: ICD-10-CM

## 2024-06-04 DIAGNOSIS — M17.0 ARTHRITIS OF BOTH KNEES: ICD-10-CM

## 2024-06-04 PROCEDURE — 73521 X-RAY EXAM HIPS BI 2 VIEWS: CPT

## 2024-06-04 PROCEDURE — 97535 SELF CARE MNGMENT TRAINING: CPT | Mod: GP

## 2024-06-04 PROCEDURE — 97140 MANUAL THERAPY 1/> REGIONS: CPT | Mod: GP

## 2024-06-04 NOTE — PROGRESS NOTES
Physical Therapy    Physical Therapy Treatment    Patient Name: Vielka Geiger  MRN: [id number]  Today's Date: 2024  Time Calculation  Start Time: 1433    Insurance   Visit number: 38 (3 of 10)   30 vs/yr (17 in '24)  Medicare Re-Certification Period: Beginnin24 - 24      Assessment:  Pt's progress has still been limited over the last few days - she had some slight improvements of her L ankle pain, but not significantly better.  She is still very limited with WB on the L LE, & still has increased swelling & TTP.  She tolerated tx well & reported decreased pain afterwards, but if she does not have more significant improvement with activity modifications + compression + elevation over the next week, will likely recommend pt f/u with Sports Med ([facility] Mc[facility]ulty or [facility]) to determine if additional medical interventions are warranted.        Plan:  OP PT Plan  Treatment/Interventions: Dry needling, Education/ Instruction, Electrical stimulation, Gait training, Manual therapy, Neuromuscular re-education, Self care/ home management, Therapeutic activities, Therapeutic exercises  PT Plan: Skilled PT  PT Frequency: 1 time per week  Certification Period Start Date: 24  Certification Period End Date: 24  Rehab Potential: Fair  Plan of Care Agreement: Patient      Current Problem  1. Chronic pain of both knees        2. Chronic pain of both ankles        3. Arthritis of both knees        4. Arthritis of both ankles              Subjective   Pt reports she's doing okay today, but has had a bit of a rough week.  States she's been having a lot of pain in the medial & lateral L ankle.   She's had to take some indomethacin a couple times this past week because of the pain.  Pt has a friend with a pool that she will be able to start using next week assuming the pool cover is fixed.  She still has limited capacity for WB because of the pain in the L ankle.         Objective   Observations:  pt.  arrived to therapy ambulating with SPC in the R UE & still demo'd decreased stance time on the L LE. Pt. was wearing B compression stockings (20-30 mmHg). Mod. edema, skin discoloration, & hardening noted B (R > L). Hypertonicity & tenderness also noted in the gastroc/soleus, peroneal, ant./post tibialis mm. Pt. also c/o increased pain along the B (R > L) malleoli (lateral > medial). Pt. c/o her 'familiar' L knee pain along the ITB, most notably at the distal insertion.      Manual Therapy (59976):  25 minutes  Soft tissue mobilization: superficial effleurage, petrissage, cross-friction, & light pressure to the L gastroc/soleus, post tib, peroneals, & foot intrinsic mm.  Joint mobs: supine L TC distraction, L mid foot IR (grade III)    Appropriate force, direction, amplitude, & velocity for selected techniques to improve joint & soft tissue mobility & enhance ADL performance. Rationale & procedure given for above treatment techniques, & verbal consent was obtained prior to initiating treatment.       Dry Needling (72356):  0 minutes  Location (Needle length x Dosage):  - L medial / lateral gastroc (50 mm x 1)  - L post tibi (50 mm x 1)  - L peroneus longus (50 mm x 1)    Type: basic insertion with needles left in situ + e-stim for mm. relaxation in B glute med   Response: no adverse events observed or reported    Pt. educated on theory & practical application of dry needling, as well as potential risks & benefits of needling, & additional verbal consent obtained prior to initiation of needling. Standard precautions, knowledge of appropriate benefits of treatment, & exclusion of relevant contraindications utilized.        Therapeutic Exercises ([id number]):  0 minutes  NuStep (level 5) x 15 min - not performed today  Walking on treadmill - not performed today    Verbally reviewed but not actively performed today:   Walking x 200'  B LE calf raises  Walking program  Standing gastroc & soleus stretch  Firm / SLS / Hip ABD  (each)  Hook-lying hip ABD iso's into belt  Hook-lying bridge  Seated lumbar flexion  Seated piriformis stretch (each)  Hook-lying TA + PPT  Walking on treadmill @ 1.2 - 1.7 mph x 10 minutes  -  HR:  64 - 82 bpm (intermittent drops from low 80's to mid 60's observed)  -  SpO2:  97-98% on RA    * = added to HEP. Sheet with exercise descriptions and images issued. Skilled intervention utilized in the appropriate selection & application of above exercises. Verbal and tactile cues provided for proper form and technique. Pt. demonstrated appropriate form & verbalized understanding of optimal technique for above exercises.       Self Care / Home Management (69897):  17 minutes    Reviewed & updated current HEP:  B LE calf raises  Standing gastroc & soleus stretch  Firm / SLS / Hip ABD (each)  Hook-lying hip ABD iso's into belt  Seated piriformis stretch (each)  Hook-lying TA + PPT  Walking program    Large portion of pt. care time devoted to reviewing pt's current symptoms & determining optimal manual techniques with appropriate HEP modifications. Reviewed importance of regular HEP adherence, & stressed importance of proper form. Reviewed differences between post-exercises soreness vs. increased 'familiar’ pain, & reviewed appropriate progressions/regressions with exercises/activities based on symptoms.  Pt. verbalized understanding & agreement.

## 2024-06-06 ENCOUNTER — APPOINTMENT (OUTPATIENT)
Dept: PHYSICAL THERAPY | Facility: CLINIC | Age: 69
End: 2024-06-06
Payer: MEDICARE

## 2024-06-11 ENCOUNTER — HOSPITAL ENCOUNTER (OUTPATIENT)
Dept: RADIOLOGY | Facility: CLINIC | Age: 69
Discharge: HOME | End: 2024-06-11
Payer: MEDICARE

## 2024-06-11 ENCOUNTER — OFFICE VISIT (OUTPATIENT)
Dept: ORTHOPEDIC SURGERY | Facility: CLINIC | Age: 69
End: 2024-06-11
Payer: MEDICARE

## 2024-06-11 DIAGNOSIS — M53.3 SI (SACROILIAC) JOINT DYSFUNCTION: Primary | ICD-10-CM

## 2024-06-11 DIAGNOSIS — M21.41 PES PLANUS OF BOTH FEET: ICD-10-CM

## 2024-06-11 DIAGNOSIS — M21.42 PES PLANUS OF BOTH FEET: ICD-10-CM

## 2024-06-11 DIAGNOSIS — M76.892 HIP ABDUCTOR TENDINITIS, LEFT: ICD-10-CM

## 2024-06-11 DIAGNOSIS — M76.72 PERONEAL TENDINITIS OF LEFT LOWER LEG: ICD-10-CM

## 2024-06-11 DIAGNOSIS — M16.0 PRIMARY OSTEOARTHRITIS OF HIPS, BILATERAL: ICD-10-CM

## 2024-06-11 DIAGNOSIS — M76.822 POSTERIOR TIBIAL TENDINITIS OF LEFT LOWER EXTREMITY: ICD-10-CM

## 2024-06-11 DIAGNOSIS — M19.071 ARTHRITIS OF BOTH ANKLES: ICD-10-CM

## 2024-06-11 DIAGNOSIS — M53.3 SI (SACROILIAC) JOINT DYSFUNCTION: ICD-10-CM

## 2024-06-11 DIAGNOSIS — M19.072 ARTHRITIS OF BOTH ANKLES: ICD-10-CM

## 2024-06-11 PROCEDURE — 72100 X-RAY EXAM L-S SPINE 2/3 VWS: CPT

## 2024-06-11 PROCEDURE — 1036F TOBACCO NON-USER: CPT | Performed by: FAMILY MEDICINE

## 2024-06-11 PROCEDURE — 99214 OFFICE O/P EST MOD 30 MIN: CPT | Performed by: FAMILY MEDICINE

## 2024-06-11 PROCEDURE — 1160F RVW MEDS BY RX/DR IN RCRD: CPT | Performed by: FAMILY MEDICINE

## 2024-06-11 PROCEDURE — 72100 X-RAY EXAM L-S SPINE 2/3 VWS: CPT | Performed by: STUDENT IN AN ORGANIZED HEALTH CARE EDUCATION/TRAINING PROGRAM

## 2024-06-11 PROCEDURE — 1125F AMNT PAIN NOTED PAIN PRSNT: CPT | Performed by: FAMILY MEDICINE

## 2024-06-11 PROCEDURE — 1159F MED LIST DOCD IN RCRD: CPT | Performed by: FAMILY MEDICINE

## 2024-06-11 RX ORDER — OLMESARTAN MEDOXOMIL, AMLODIPINE AND HYDROCHLOROTHIAZIDE TABLET 40/5/12.5 MG 40; 5; 12.5 MG/1; MG/1; MG/1
TABLET ORAL
COMMUNITY

## 2024-06-11 RX ORDER — NEBIVOLOL 10 MG/1
10 TABLET ORAL DAILY
COMMUNITY

## 2024-06-11 RX ORDER — MELOXICAM 15 MG/1
15 TABLET ORAL DAILY PRN
Qty: 30 TABLET | Refills: 0 | Status: SHIPPED | OUTPATIENT
Start: 2024-06-11 | End: 2024-07-11

## 2024-06-11 ASSESSMENT — PAIN SCALES - GENERAL: PAINLEVEL_OUTOF10: 4

## 2024-06-11 ASSESSMENT — PAIN - FUNCTIONAL ASSESSMENT: PAIN_FUNCTIONAL_ASSESSMENT: 0-10

## 2024-06-11 ASSESSMENT — PAIN DESCRIPTION - DESCRIPTORS: DESCRIPTORS: ACHING;SHARP

## 2024-06-11 NOTE — PROGRESS NOTES
History of Present Illness:  Encounter date: 06/11/2024  Vielka Geiger is a 68 y.o. female who is known to me from previous visits, presenting today for follow-up of chronic bilateral ankle pain and left hip/low back pain.     12/27/2022: She presents by self referral for evaluation of chronic post traumatic right ankle pain and chronic bilateral knee pain. She notes that she had a right ankle fracture in 2016 and was managed through the [facility]. Since she has had years of pain in her ankle however denies any interval re-injury or trauma. She feels its limiting her ability to lose weight and has as of late noticed her both knees causing her pain with steps and kneeling. She notes having chronic venous stasis and admits to being overweight but otherwise feels well. She feels her ankle may be contributing to her knee pain and she would like further assessment and treatment options. She denies use of regular medications or prior surgery or injections. She denies formal therapy or a home therapy program. She feels no significant pain at rest but worse with prolonged standing, walking and going up and down steps. She denies numbness, tingling, foot drop, or unilateral limb weakness.     6/11/2024: She has a history of bilateral knee and ankle arthritis, follows regularly with PT, Wilber Dorsey, and states her left ankle pain recently worsened roughly 2 weeks ago without any known injury/trauma. She has been working with PT still, but had more difficulty with pain. She has taken indomethacin 75 mg infrequently without any relief, though states her pain significantly improved over the last 2 days. She has chronic swelling of both ankles, L>R, and states her swelling seemed slightly worse, but has improved to baseline. Denies any associated redness or warmth. She also has history of chronic LBP and posterior hip pain which has become more bothersome over the last several months. She denies any  history of injury/trauma to either hip and states pain is primarily posterior and lateral without distal radiation, or any associated numbness, tingling, weakness, bowel/bladder incontinence, or saddle paresthesias. Pain is worse after longer durations of standing/walking and with certain low back and hip movements. She has not been seeing a healthcare provider for low back and bilateral hip pain. States she will be getting new shoes through [employer] soon and tries to wear comfortable footwear regularly.     Physical Exam:  The Left hip and pelvis are without obvious signs of acute bony deformity or instability. Active and passive range of motion are full and painful. Log roll is minimally positive. Straight leg raise and seated slump test are negative. Christie is positive for SI joint pain on the left. FADIR elicited pain in left groin. Hip strength is weak as compared to the opposite hip. + Trendelenberg and Rebecca. There is tenderness to palpation over bilateral SI joints, greater trochanters, gluteus medius, with all tenderness worse on left when compared to right. The opposite hip is stable. Gait is antalgic and tandem.     The Left ankle is without obvious signs of acute bony deformity, erythema or ecchymosis. There is pitting edema of bilateral distal lower extremities, L>R. There is minimal tenderness to the medial joint line. There is minimal tenderness to the lateral joint line. There is no bony crepitus or step-off. Active range of motion is full, painful in inversion and eversion, and symmetrical. Passive range of motion is full, pain-free and symmetrical. Instability test are negative with anterior drawer, talar tilt and eversion stress tests. Bustamante's test and Homans sign are negative. Strength is normal compared to the opposite ankle. There is pain with resisted left ankle eversion and inversion. There is tenderness to palpation over left posterior tibialis and peroneal tendons posterior to medial and  lateral malleoli. The right ankle is otherwise normal and stable. Gait is slightly painful, antalgic, and tandem.     Imaging:  === 06/04/24 ===    XR HIPS BILATERAL 2 VW WITH PELVIS WHEN PERFORMED    - Impression -  Moderate degenerative changes in the hips    MACRO:  None    Signed by: Jackson Wilson 6/5/2024 6:37 PM  Dictation workstation:   [id number]     Radiographs of bilateral hips obtained from outside source on 6/4/2024 were reviewed and revealed moderate DJD bilaterally. Radiographs of the lumbar spine obtained today which revealed DDD, most significant at L4-S1 and curvature which may be postural, no acute osseous abnormalities.  The studies were reviewed with Dr. Mcnulty personally in the office today.    Problem List Items Addressed This Visit             ICD-10-CM    Arthritis of both ankles M19.071, M19.072    Relevant Medications    meloxicam (Mobic) 15 mg tablet     Other Visit Diagnoses         Codes    SI (sacroiliac) joint dysfunction    -  Primary M53.3    Relevant Orders    XR lumbar spine 2-3 views    Primary osteoarthritis of hips, bilateral     M16.0    Hip abductor tendinitis, left     M76.892    Pes planus of both feet     M21.41, M21.42    Posterior tibial tendinitis of left lower extremity     M76.822    Peroneal tendinitis of left lower leg     M76.72          Patient Discussion/Summary  We reviewed the exam and x-ray findings and discussed the conservative and surgical treatment options. We agreed to conservative management for several musculoskeletal issues. Regarding ankle arthritis and posterior tibialis/peroneal tendinitis, recommend she continue to follow with PT as this has been providing significant benefit. She should also  new shoes from [employer] and recommended OTC shoe insoles from [employer] to be worn regularly. Recommended prescription doses of Voltaren gel over the ankle three times daily in addition to continuing ice and elevation as needed for  swelling/pain. Continue using compression stockings daily.   Regarding bilateral hip pain, we discussed that she has moderate degenerative changes in both hips which may be contributing to her pain, though location of pain and physical exam more consistent with pain originating from hip abductor tendinitis/IT band syndrome and SI joint dysfunction. Lumbar spine XR did reveal DDD, worse at L4-S1, which may be contributing to her pain. Encouraged her to work with PT on bilateral hip stability and core strengthening. We prescribed meloxicam 15 mg to be taken once daily with food as needed for her pain. Will plan for follow-up in 4-6 weeks as needed if no improvement, any worsening, or any new concerns arise.     **This note was dictated using Dragon speech recognition software and was not corrected for spelling or grammatical errors**     Romie Mcnulty M.D.  Clinical , Division of Sports Medicine  Primary Care Sports Medicine  Department of Orthopedic Surgery  [facility]

## 2024-06-13 ENCOUNTER — APPOINTMENT (OUTPATIENT)
Dept: PHYSICAL THERAPY | Facility: CLINIC | Age: 69
End: 2024-06-13
Payer: MEDICARE

## 2024-06-13 DIAGNOSIS — M19.071 ARTHRITIS OF BOTH ANKLES: ICD-10-CM

## 2024-06-13 DIAGNOSIS — M25.561 CHRONIC PAIN OF BOTH KNEES: Primary | ICD-10-CM

## 2024-06-13 DIAGNOSIS — M19.072 ARTHRITIS OF BOTH ANKLES: ICD-10-CM

## 2024-06-13 DIAGNOSIS — M25.562 CHRONIC PAIN OF BOTH KNEES: Primary | ICD-10-CM

## 2024-06-13 DIAGNOSIS — G89.29 CHRONIC PAIN OF BOTH ANKLES: ICD-10-CM

## 2024-06-13 DIAGNOSIS — M25.571 CHRONIC PAIN OF BOTH ANKLES: ICD-10-CM

## 2024-06-13 DIAGNOSIS — M17.0 ARTHRITIS OF BOTH KNEES: ICD-10-CM

## 2024-06-13 DIAGNOSIS — M25.572 CHRONIC PAIN OF BOTH ANKLES: ICD-10-CM

## 2024-06-13 DIAGNOSIS — G89.29 CHRONIC PAIN OF BOTH KNEES: Primary | ICD-10-CM

## 2024-06-13 PROCEDURE — 97535 SELF CARE MNGMENT TRAINING: CPT | Mod: GP

## 2024-06-13 PROCEDURE — 97140 MANUAL THERAPY 1/> REGIONS: CPT | Mod: GP

## 2024-06-13 NOTE — PROGRESS NOTES
Physical Therapy    Physical Therapy Treatment    Patient Name: Vielka Geiger  MRN: [id number]  Today's Date: 2024  Time Calculation  Start Time: 1450  Stop Time: 1530  Time Calculation (min): 40 min    Insurance   Visit number: 39 (4 of 10)   30 vs/yr (18 in )  Medicare Re-Certification Period: Beginnin24 - 24      Assessment:  Pt has resumed making progress toward her goals & her ankle pain is still there, but significantly improved compared to previous f/u's.  Pt's hip & back pain were a little more aggravated today, but this was likely ongoing but masked by the ankle pain & she tolerated tx well & reported decreased pain afterwards.  Anticipate pt. will see improved progress with regular HEP adherence, but they would still benefit from regular f/u's to monitor their progress & ensure appropriate progressions.       Plan:  OP PT Plan  Treatment/Interventions: Dry needling, Education/ Instruction, Electrical stimulation, Gait training, Manual therapy, Neuromuscular re-education, Self care/ home management, Therapeutic activities, Therapeutic exercises  PT Plan: Skilled PT  PT Frequency: 1 time per week  Certification Period Start Date: 24  Certification Period End Date: 24  Rehab Potential: Fair  Plan of Care Agreement: Patient      Current Problem  1. Chronic pain of both knees        2. Chronic pain of both ankles        3. Arthritis of both knees        4. Arthritis of both ankles              Subjective   Pt reports she went to see Dr. Ann & Dr. Mcnulty in Sports Medicine who took some x-rays & prescribed some Voltaren gel.  Pt also obtained some new [employer] shoes with some [employer] insoles which has made a huge difference in her pain.  Pt's imaging showed some sacroiliac arthritis, as well as arthritis as L3-5.  She's been able to walk more IND, but still has some discomfort in the medial malleolus even with walking short distances.        Objective   Observations:   pt. arrived to therapy ambulating with SPC in the R UE & still demo'd decreased stance time on the L LE. Pt. was wearing B compression stockings (20-30 mmHg). Mod. edema, skin discoloration, & hardening noted B (R > L). Hypertonicity & tenderness also noted in the gastroc/soleus, peroneal, ant./post tibialis mm. Pt. also c/o increased pain along the B (R > L) malleoli (lateral > medial). Pt. c/o her 'familiar' L knee pain along the ITB, most notably at the distal insertion.      Manual Therapy (72180):  20 minutes  Soft tissue mobilization: superficial effleurage, petrissage, cross-friction, & light pressure to the B gluteals, TFL, hip flexor, quadriceps, & ITB, & L gastroc/soleus, post tib, peroneals, & foot intrinsic mm.  Joint mobs: supine L TC distraction, L mid foot IR (grade III)    Appropriate force, direction, amplitude, & velocity for selected techniques to improve joint & soft tissue mobility & enhance ADL performance. Rationale & procedure given for above treatment techniques, & verbal consent was obtained prior to initiating treatment.       Dry Needling ([id number]):  7 minutes  Location (Needle length x Dosage):  - B TFL (75 mm x 1)  - B glute med (75 mm x 2)  - B glute max (75 mm x 2)  - B piriformis (75 x 1)    Type: basic insertion with needles left in situ + e-stim for mm. relaxation in B glute med   Response: no adverse events observed or reported    Pt. educated on theory & practical application of dry needling, as well as potential risks & benefits of needling, & additional verbal consent obtained prior to initiation of needling. Standard precautions, knowledge of appropriate benefits of treatment, & exclusion of relevant contraindications utilized.        Therapeutic Exercises (18922):  0 minutes  NuStep (level 3) x 10 min - performed IND  Walking on treadmill - not performed today    Verbally reviewed but not actively performed today:   Walking x 200'  B LE calf raises  Walking program  Standing  gastroc & soleus stretch  Firm / SLS / Hip ABD (each)  Hook-lying hip ABD iso's into belt  Hook-lying bridge  Seated lumbar flexion  Seated piriformis stretch (each)  Hook-lying TA + PPT  Walking on treadmill @ 1.2 - 1.7 mph x 10 minutes  -  HR:  64 - 82 bpm (intermittent drops from low 80's to mid 60's observed)  -  SpO2:  97-98% on RA    * = added to HEP. Sheet with exercise descriptions and images issued. Skilled intervention utilized in the appropriate selection & application of above exercises. Verbal and tactile cues provided for proper form and technique. Pt. demonstrated appropriate form & verbalized understanding of optimal technique for above exercises.       Self Care / Home Management ([id number]):  13 minutes    Reviewed & updated current HEP:  B LE calf raises  Standing gastroc & soleus stretch  Firm / SLS / Hip ABD (each)  Hook-lying hip ABD iso's into belt  Seated piriformis stretch (each)  Hook-lying TA + PPT  Walking program    Large portion of pt. care time devoted to reviewing pt's current symptoms & determining optimal manual techniques with appropriate HEP modifications. Reviewed importance of regular HEP adherence, & stressed importance of proper form. Reviewed differences between post-exercises soreness vs. increased 'familiar’ pain, & reviewed appropriate progressions/regressions with exercises/activities based on symptoms.  Pt. verbalized understanding & agreement.

## 2024-06-20 ENCOUNTER — APPOINTMENT (OUTPATIENT)
Dept: PHYSICAL THERAPY | Facility: CLINIC | Age: 69
End: 2024-06-20
Payer: MEDICARE

## 2024-06-20 DIAGNOSIS — M25.562 CHRONIC PAIN OF BOTH KNEES: Primary | ICD-10-CM

## 2024-06-20 DIAGNOSIS — M25.561 CHRONIC PAIN OF BOTH KNEES: Primary | ICD-10-CM

## 2024-06-20 DIAGNOSIS — G89.29 CHRONIC PAIN OF BOTH ANKLES: ICD-10-CM

## 2024-06-20 DIAGNOSIS — M19.071 ARTHRITIS OF BOTH ANKLES: ICD-10-CM

## 2024-06-20 DIAGNOSIS — M25.572 CHRONIC PAIN OF BOTH ANKLES: ICD-10-CM

## 2024-06-20 DIAGNOSIS — G89.29 CHRONIC PAIN OF BOTH KNEES: Primary | ICD-10-CM

## 2024-06-20 DIAGNOSIS — M17.0 ARTHRITIS OF BOTH KNEES: ICD-10-CM

## 2024-06-20 DIAGNOSIS — M19.072 ARTHRITIS OF BOTH ANKLES: ICD-10-CM

## 2024-06-20 DIAGNOSIS — M25.571 CHRONIC PAIN OF BOTH ANKLES: ICD-10-CM

## 2024-06-20 PROCEDURE — 97140 MANUAL THERAPY 1/> REGIONS: CPT | Mod: GP

## 2024-06-20 PROCEDURE — 97535 SELF CARE MNGMENT TRAINING: CPT | Mod: GP

## 2024-06-20 NOTE — PROGRESS NOTES
Physical Therapy    Physical Therapy Treatment    Patient Name: Vielka Geiger  MRN: [id number]  Today's Date: 2024  Time Calculation  Start Time: 1436  Stop Time: 1516  Time Calculation (min): 40 min    Insurance   Visit number: 40 (5 of 10)   30 vs/yr (19 in '24)  Medicare Re-Certification Period: Beginnin24 - 24      Assessment:  Pt continues to make slow, but steady progress toward her goals.  She tolerated a trial of DN + e-stim into the lumbar region without any adverse events.  Anticipate pt. will see improved progress with regular HEP adherence, but they would still benefit from regular f/u's to monitor their progress & ensure appropriate progressions.       Plan:  OP PT Plan  Treatment/Interventions: Dry needling, Education/ Instruction, Electrical stimulation, Gait training, Manual therapy, Neuromuscular re-education, Self care/ home management, Therapeutic activities, Therapeutic exercises  PT Plan: Skilled PT  PT Frequency: 1 time per week  Certification Period Start Date: 24  Certification Period End Date: 24  Rehab Potential: Fair  Plan of Care Agreement: Patient      Current Problem  1. Chronic pain of both knees        2. Chronic pain of both ankles        3. Arthritis of both knees        4. Arthritis of both ankles              Subjective   Pt reports she's a little better this week, but definitely no worse.  Pt states the heel on the new shoes are still rubbing so she's going to stop by the shoe store later today to see if they can adjust or get new ones.  Pt has been having increased low back pain, but the ankle pain has been holding up okay.      Objective   Observations:  pt. arrived to therapy ambulating with SPC in the R UE & still demo'd decreased stance time on the L LE. Pt. was wearing B compression stockings (20-30 mmHg). Mod. edema, skin discoloration, & hardening noted B (R > L). Hypertonicity & tenderness also noted in the gastroc/soleus, peroneal,  ant./post tibialis mm. Pt. also c/o increased pain along the B (R > L) malleoli (lateral > medial). Pt. c/o her 'familiar' L knee pain along the ITB, most notably at the distal insertion.      Manual Therapy ([id number]):  23 minutes  Soft tissue mobilization: superficial effleurage, petrissage, cross-friction, & light pressure to the B gluteals, TFL, hip flexor, quadriceps, & ITB, & L gastroc/soleus, post tib, peroneals, & foot intrinsic mm.  Joint mobs: supine L TC distraction, L mid foot IR (grade III)    Appropriate force, direction, amplitude, & velocity for selected techniques to improve joint & soft tissue mobility & enhance ADL performance. Rationale & procedure given for above treatment techniques, & verbal consent was obtained prior to initiating treatment.       Dry Needling ([id number]):  7 minutes  Location (Needle length x Dosage):  - B TFL (75 mm x 1)  - B glute med (75 mm x 2)  - B glute max (75 mm x 2)  - B L4-5 (75 mm x 1)    Type: basic insertion with needles left in situ + e-stim for mm. relaxation in B glute med   Response: no adverse events observed or reported    Pt. educated on theory & practical application of dry needling, as well as potential risks & benefits of needling, & additional verbal consent obtained prior to initiation of needling. Standard precautions, knowledge of appropriate benefits of treatment, & exclusion of relevant contraindications utilized.        Therapeutic Exercises ([id number]):  0 minutes  NuStep (level 3) x 10 min - performed IND  Walking on treadmill - not performed today    Verbally reviewed but not actively performed today:   Walking x 200'  B LE calf raises  Walking program  Standing gastroc & soleus stretch  Firm / SLS / Hip ABD (each)  Hook-lying hip ABD iso's into belt  Hook-lying bridge  Seated lumbar flexion  Seated piriformis stretch (each)  Hook-lying TA + PPT  Walking on treadmill @ 1.2 - 1.7 mph x 10 minutes  -  HR:  64 - 82 bpm (intermittent drops from low 80's to  mid 60's observed)  -  SpO2:  97-98% on RA    * = added to HEP. Sheet with exercise descriptions and images issued. Skilled intervention utilized in the appropriate selection & application of above exercises. Verbal and tactile cues provided for proper form and technique. Pt. demonstrated appropriate form & verbalized understanding of optimal technique for above exercises.       Self Care / Home Management ([id number]):  10 minutes    Reviewed & updated current HEP:  B LE calf raises  Standing gastroc & soleus stretch  Firm / SLS / Hip ABD (each)  Hook-lying hip ABD iso's into belt  Seated piriformis stretch (each)  Hook-lying TA + PPT  Walking program    Large portion of pt. care time devoted to reviewing pt's current symptoms & determining optimal manual techniques with appropriate HEP modifications. Reviewed importance of regular HEP adherence, & stressed importance of proper form. Reviewed differences between post-exercises soreness vs. increased 'familiar’ pain, & reviewed appropriate progressions/regressions with exercises/activities based on symptoms.  Pt. verbalized understanding & agreement.

## 2024-06-27 ENCOUNTER — APPOINTMENT (OUTPATIENT)
Dept: PHYSICAL THERAPY | Facility: CLINIC | Age: 69
End: 2024-06-27
Payer: MEDICARE

## 2024-06-27 DIAGNOSIS — M25.572 CHRONIC PAIN OF BOTH ANKLES: ICD-10-CM

## 2024-06-27 DIAGNOSIS — M25.561 CHRONIC PAIN OF BOTH KNEES: Primary | ICD-10-CM

## 2024-06-27 DIAGNOSIS — G89.29 CHRONIC PAIN OF BOTH ANKLES: ICD-10-CM

## 2024-06-27 DIAGNOSIS — M19.072 ARTHRITIS OF BOTH ANKLES: ICD-10-CM

## 2024-06-27 DIAGNOSIS — M17.0 ARTHRITIS OF BOTH KNEES: ICD-10-CM

## 2024-06-27 DIAGNOSIS — G89.29 CHRONIC PAIN OF BOTH KNEES: Primary | ICD-10-CM

## 2024-06-27 DIAGNOSIS — M19.071 ARTHRITIS OF BOTH ANKLES: ICD-10-CM

## 2024-06-27 DIAGNOSIS — M25.571 CHRONIC PAIN OF BOTH ANKLES: ICD-10-CM

## 2024-06-27 DIAGNOSIS — M25.562 CHRONIC PAIN OF BOTH KNEES: Primary | ICD-10-CM

## 2024-06-27 PROCEDURE — 97535 SELF CARE MNGMENT TRAINING: CPT | Mod: GP

## 2024-06-27 PROCEDURE — 97140 MANUAL THERAPY 1/> REGIONS: CPT | Mod: GP

## 2024-06-27 NOTE — PROGRESS NOTES
Physical Therapy    Physical Therapy Treatment    Patient Name: Vielka Geiger  MRN: [id number]  Today's Date: 2024  Time Calculation  Start Time: 1430  Stop Time: 1509  Time Calculation (min): 39 min    Insurance   Visit number: 41 (6 of 10)   30 vs/yr (20 in '24)  Medicare Re-Certification Period: Beginnin24 - 24      Assessment:  Pt continues to make slow, but steady progress toward her goals.  She tolerated a trial of DN + e-stim into the lumbar region without any adverse events.  Anticipate pt. will see improved progress with regular HEP adherence, but they would still benefit from regular f/u's to monitor their progress & ensure appropriate progressions.       Plan:  OP PT Plan  Treatment/Interventions: Dry needling, Education/ Instruction, Electrical stimulation, Gait training, Manual therapy, Neuromuscular re-education, Self care/ home management, Therapeutic activities, Therapeutic exercises  PT Plan: Skilled PT  PT Frequency: 1 time per week  Certification Period Start Date: 24  Certification Period End Date: 24  Rehab Potential: Fair  Plan of Care Agreement: Patient      Current Problem  1. Chronic pain of both knees        2. Chronic pain of both ankles        3. Arthritis of both knees        4. Arthritis of both ankles                Subjective   Pt reports she's doing okay - her tendonitis is 'quiet' right now.  She has her brother's family coming into town for a memorial which is causing some stress.  Pt reports she was on her feet 'an insane' amount yesterday & her back & knees are a little sore, but generally she's doing okay.      Objective   Observations:  pt. arrived to therapy ambulating with SPC in the R UE & still demo'd decreased stance time on the L LE. Pt. was wearing B compression stockings (20-30 mmHg). Mod. edema, skin discoloration, & hardening noted B (R > L). Hypertonicity & tenderness also noted in the gastroc/soleus, peroneal, ant./post tibialis  mm. Pt. also c/o increased pain along the B (R > L) malleoli (lateral > medial). Pt. c/o her 'familiar' L knee pain along the ITB, most notably at the distal insertion.      Manual Therapy ([id number]):  23 minutes  Soft tissue mobilization: superficial effleurage, petrissage, cross-friction, & light pressure to the B gluteals, TFL, hip flexor, quadriceps, & ITB, & L gastroc/soleus, post tib, peroneals, & foot intrinsic mm.  Joint mobs: supine L TC distraction, L mid foot IR (grade III)    Appropriate force, direction, amplitude, & velocity for selected techniques to improve joint & soft tissue mobility & enhance ADL performance. Rationale & procedure given for above treatment techniques, & verbal consent was obtained prior to initiating treatment.       Dry Needling ([id number]):  6 minutes  Location (Needle length x Dosage):  - B TFL (75 mm x 1)  - B glute med (75 mm x 2)  - B glute max (75 mm x 2)  - B L4-5 (75 mm x 1)    Type: basic insertion with needles left in situ + e-stim for mm. relaxation in B glute med   Response: no adverse events observed or reported    Pt. educated on theory & practical application of dry needling, as well as potential risks & benefits of needling, & additional verbal consent obtained prior to initiation of needling. Standard precautions, knowledge of appropriate benefits of treatment, & exclusion of relevant contraindications utilized.        Therapeutic Exercises (01049):  0 minutes  NuStep (level 4) x 15 min - performed IND  Walking on treadmill - not performed today    Verbally reviewed but not actively performed today:   Walking x 200'  B LE calf raises  Walking program  Standing gastroc & soleus stretch  Firm / SLS / Hip ABD (each)  Hook-lying hip ABD iso's into belt  Hook-lying bridge  Seated lumbar flexion  Seated piriformis stretch (each)  Hook-lying TA + PPT  Walking on treadmill @ 1.2 - 1.7 mph x 10 minutes  -  HR:  64 - 82 bpm (intermittent drops from low 80's to mid 60's  observed)  -  SpO2:  97-98% on RA    * = added to HEP. Sheet with exercise descriptions and images issued. Skilled intervention utilized in the appropriate selection & application of above exercises. Verbal and tactile cues provided for proper form and technique. Pt. demonstrated appropriate form & verbalized understanding of optimal technique for above exercises.       Self Care / Home Management ([id number]):  10 minutes    Reviewed & updated current HEP:  B LE calf raises  Standing gastroc & soleus stretch  Firm / SLS / Hip ABD (each)  Hook-lying hip ABD iso's into belt  Seated piriformis stretch (each)  Hook-lying TA + PPT  Walking program    Large portion of pt. care time devoted to reviewing pt's current symptoms & determining optimal manual techniques with appropriate HEP modifications. Reviewed importance of regular HEP adherence, & stressed importance of proper form. Reviewed differences between post-exercises soreness vs. increased 'familiar’ pain, & reviewed appropriate progressions/regressions with exercises/activities based on symptoms.  Pt. verbalized understanding & agreement.

## 2024-07-02 ENCOUNTER — APPOINTMENT (OUTPATIENT)
Dept: PHYSICAL THERAPY | Facility: CLINIC | Age: 69
End: 2024-07-02
Payer: MEDICARE

## 2024-07-02 DIAGNOSIS — M17.0 ARTHRITIS OF BOTH KNEES: ICD-10-CM

## 2024-07-02 DIAGNOSIS — M25.572 CHRONIC PAIN OF BOTH ANKLES: ICD-10-CM

## 2024-07-02 DIAGNOSIS — G89.29 CHRONIC PAIN OF BOTH KNEES: Primary | ICD-10-CM

## 2024-07-02 DIAGNOSIS — G89.29 CHRONIC PAIN OF BOTH ANKLES: ICD-10-CM

## 2024-07-02 DIAGNOSIS — M25.561 CHRONIC PAIN OF BOTH KNEES: Primary | ICD-10-CM

## 2024-07-02 DIAGNOSIS — M19.072 ARTHRITIS OF BOTH ANKLES: ICD-10-CM

## 2024-07-02 DIAGNOSIS — M25.571 CHRONIC PAIN OF BOTH ANKLES: ICD-10-CM

## 2024-07-02 DIAGNOSIS — M19.071 ARTHRITIS OF BOTH ANKLES: ICD-10-CM

## 2024-07-02 DIAGNOSIS — M25.562 CHRONIC PAIN OF BOTH KNEES: Primary | ICD-10-CM

## 2024-07-02 PROCEDURE — 97535 SELF CARE MNGMENT TRAINING: CPT | Mod: GP

## 2024-07-02 PROCEDURE — 97140 MANUAL THERAPY 1/> REGIONS: CPT | Mod: GP

## 2024-07-02 NOTE — PROGRESS NOTES
Physical Therapy    Physical Therapy Treatment    Patient Name: Vielka Geiger  MRN: [id number]  Today's Date: 2024  Time Calculation  Start Time: 1434  Stop Time: 1516  Time Calculation (min): 42 min    Insurance   Visit number: 42 (7 of 10)   30 vs/yr (21 in )  Medicare Re-Certification Period: Beginnin24 - 24      Assessment:  Pt appears to be making a little more steady improvements since her last f/u.  Pt continues to see benefits from DN + e-stim & is slowly tolerating increased activity without c/o increased pain.  Anticipate pt. will see improved progress with regular HEP adherence, but they would still benefit from regular f/u's to monitor their progress & ensure appropriate progressions.       Plan:  OP PT Plan  Treatment/Interventions: Dry needling, Education/ Instruction, Electrical stimulation, Gait training, Manual therapy, Neuromuscular re-education, Self care/ home management, Therapeutic activities, Therapeutic exercises  PT Plan: Skilled PT  PT Frequency: 1 time per week  Certification Period Start Date: 24  Certification Period End Date: 24  Rehab Potential: Fair  Plan of Care Agreement: Patient      Current Problem  1. Chronic pain of both knees        2. Chronic pain of both ankles        3. Arthritis of both knees        4. Arthritis of both ankles              Subjective   Pt reports she's doing better & isn't having as much pain.  States she was able to stand/walk/be on the boat for longer periods over the weekend for her brother's memorial without too many issues.  Pt reports her low back is still 'weary' but not as painful, & her L post tib is a little sore, but generally better.        Objective   Observations:  pt. arrived to therapy ambulating IND, but still demo'd decreased stance time on the L LE. Pt. was wearing B compression stockings (20-30 mmHg). Mod. edema, skin discoloration, & hardening noted B (R > L). Hypertonicity & tenderness also noted in  the gastroc/soleus, peroneal, ant./post tibialis mm. Pt. also c/o increased pain along the B (R > L) malleoli (lateral > medial). Pt. c/o her 'familiar' L knee pain along the ITB, most notably at the distal insertion.      Manual Therapy (77194):  24 minutes  Soft tissue mobilization: superficial effleurage, petrissage, cross-friction, & light pressure to the B gluteals, TFL, hip flexor, quadriceps, & ITB, & L gastroc/soleus, post tib, peroneals, & foot intrinsic mm.  Joint mobs: supine L TC distraction, L mid foot IR (grade III)    Appropriate force, direction, amplitude, & velocity for selected techniques to improve joint & soft tissue mobility & enhance ADL performance. Rationale & procedure given for above treatment techniques, & verbal consent was obtained prior to initiating treatment.       Dry Needling ([id number]):  6 minutes  Location (Needle length x Dosage):  - B TFL (75 mm x 1)  - B glute med (75 mm x 2)  - B glute max (75 mm x 2)  - B L4-5 (75 mm x 1)    Type: basic insertion with needles left in situ + e-stim for mm. relaxation in B glute med   Response: no adverse events observed or reported    Pt. educated on theory & practical application of dry needling, as well as potential risks & benefits of needling, & additional verbal consent obtained prior to initiation of needling. Standard precautions, knowledge of appropriate benefits of treatment, & exclusion of relevant contraindications utilized.        Therapeutic Exercises (90310):  0 minutes  NuStep (level 4) x 15 min - performed IND  Walking on treadmill - not performed today    Verbally reviewed but not actively performed today:   Walking x 200'  B LE calf raises  Walking program  Standing gastroc & soleus stretch  Firm / SLS / Hip ABD (each)  Hook-lying hip ABD iso's into belt  Hook-lying bridge  Seated lumbar flexion  Seated piriformis stretch (each)  Hook-lying TA + PPT  Walking on treadmill @ 1.2 - 1.7 mph x 10 minutes  -  HR:  64 - 82 bpm  (intermittent drops from low 80's to mid 60's observed)  -  SpO2:  97-98% on RA    * = added to HEP. Sheet with exercise descriptions and images issued. Skilled intervention utilized in the appropriate selection & application of above exercises. Verbal and tactile cues provided for proper form and technique. Pt. demonstrated appropriate form & verbalized understanding of optimal technique for above exercises.       Self Care / Home Management ([id number]):  12 minutes    Reviewed & updated current HEP:  B LE calf raises  Standing gastroc & soleus stretch  Firm / SLS / Hip ABD (each)  Hook-lying hip ABD iso's into belt  Seated piriformis stretch (each)  Hook-lying TA + PPT  Walking program    Large portion of pt. care time devoted to reviewing pt's current symptoms & determining optimal manual techniques with appropriate HEP modifications. Reviewed importance of regular HEP adherence, & stressed importance of proper form. Reviewed differences between post-exercises soreness vs. increased 'familiar’ pain, & reviewed appropriate progressions/regressions with exercises/activities based on symptoms.  Pt. verbalized understanding & agreement.

## 2024-07-11 ENCOUNTER — APPOINTMENT (OUTPATIENT)
Dept: PHYSICAL THERAPY | Facility: CLINIC | Age: 69
End: 2024-07-11
Payer: MEDICARE

## 2024-07-11 DIAGNOSIS — M25.561 CHRONIC PAIN OF BOTH KNEES: Primary | ICD-10-CM

## 2024-07-11 DIAGNOSIS — M19.072 ARTHRITIS OF BOTH ANKLES: ICD-10-CM

## 2024-07-11 DIAGNOSIS — G89.29 CHRONIC PAIN OF BOTH ANKLES: ICD-10-CM

## 2024-07-11 DIAGNOSIS — M19.071 ARTHRITIS OF BOTH ANKLES: ICD-10-CM

## 2024-07-11 DIAGNOSIS — M25.562 CHRONIC PAIN OF BOTH KNEES: Primary | ICD-10-CM

## 2024-07-11 DIAGNOSIS — M25.571 CHRONIC PAIN OF BOTH ANKLES: ICD-10-CM

## 2024-07-11 DIAGNOSIS — M17.0 ARTHRITIS OF BOTH KNEES: ICD-10-CM

## 2024-07-11 DIAGNOSIS — M25.572 CHRONIC PAIN OF BOTH ANKLES: ICD-10-CM

## 2024-07-11 DIAGNOSIS — G89.29 CHRONIC PAIN OF BOTH KNEES: Primary | ICD-10-CM

## 2024-07-11 PROCEDURE — 97140 MANUAL THERAPY 1/> REGIONS: CPT | Mod: GP

## 2024-07-11 PROCEDURE — 97535 SELF CARE MNGMENT TRAINING: CPT | Mod: GP
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
## 2024-07-25 ENCOUNTER — APPOINTMENT (OUTPATIENT)
Dept: PHYSICAL THERAPY | Facility: CLINIC | Age: 69
End: 2024-07-25
Payer: MEDICARE

## 2024-08-01 ENCOUNTER — APPOINTMENT (OUTPATIENT)
Dept: PHYSICAL THERAPY | Facility: CLINIC | Age: 69
End: 2024-08-01
Payer: MEDICARE

## 2024-08-01 DIAGNOSIS — M25.571 CHRONIC PAIN OF BOTH ANKLES: ICD-10-CM

## 2024-08-01 DIAGNOSIS — M19.072 ARTHRITIS OF BOTH ANKLES: ICD-10-CM

## 2024-08-01 DIAGNOSIS — G89.29 CHRONIC PAIN OF BOTH KNEES: Primary | ICD-10-CM

## 2024-08-01 DIAGNOSIS — M19.071 ARTHRITIS OF BOTH ANKLES: ICD-10-CM

## 2024-08-01 DIAGNOSIS — M25.572 CHRONIC PAIN OF BOTH ANKLES: ICD-10-CM

## 2024-08-01 DIAGNOSIS — M17.0 ARTHRITIS OF BOTH KNEES: ICD-10-CM

## 2024-08-01 DIAGNOSIS — M25.561 CHRONIC PAIN OF BOTH KNEES: Primary | ICD-10-CM

## 2024-08-01 DIAGNOSIS — M25.562 CHRONIC PAIN OF BOTH KNEES: Primary | ICD-10-CM

## 2024-08-01 DIAGNOSIS — G89.29 CHRONIC PAIN OF BOTH ANKLES: ICD-10-CM

## 2024-08-01 PROCEDURE — 97140 MANUAL THERAPY 1/> REGIONS: CPT | Mod: GP

## 2024-08-01 PROCEDURE — 97535 SELF CARE MNGMENT TRAINING: CPT | Mod: GP

## 2024-08-01 NOTE — PROGRESS NOTES
Physical Therapy    Physical Therapy Treatment    Patient Name: Vielka Geiger  MRN: [id number]  Today's Date: 2024  Time Calculation  Start Time: 1446  Stop Time: 1540  Time Calculation (min): 54 min    Insurance   Visit number: 44 (9 of 10)   30 vs/yr (22 in )  Medicare Re-Certification Period: Beginnin24 - 24      Assessment:  Pt appears to be mananging her symptoms a little better IND as she was not seen for ~3 weeks d/t various vacations/scheduling.  Pt still has some back & R hip pain, but she tolerated tx well & reported decreased pain afterwards.  Anticipate pt. will see continued progress with regular HEP adherence, but they would still benefit from regular f/u's to monitor their progress & ensure appropriate progressions.       Plan:  OP PT Plan  Treatment/Interventions: Dry needling, Education/ Instruction, Electrical stimulation, Gait training, Manual therapy, Neuromuscular re-education, Self care/ home management, Therapeutic activities, Therapeutic exercises  PT Plan: Skilled PT  PT Frequency: 1 time per week  Certification Period Start Date: 24  Certification Period End Date: 24  Rehab Potential: Fair  Plan of Care Agreement: Patient      Current Problem  1. Chronic pain of both knees        2. Chronic pain of both ankles        3. Arthritis of both knees        4. Arthritis of both ankles              Subjective   Pt reports she's been having more burning in her mid back as well as her R hip.  She was spending some time with family & doing a lot of walking which caused some increased pain in the L medial malleolus.  The ankle pain was so bad she had to take a meloxicam which really helped.        Objective   Observations:  pt. arrived to therapy ambulating with SPC in the R UE, & still demo'd decreased stance time on the L LE. Pt. was wearing B compression stockings (20-30 mmHg). Mod. edema, skin discoloration, & hardening noted B (R > L). Hypertonicity &  tenderness also noted in the gastroc/soleus, peroneal, ant./post tibialis mm. Pt. also c/o increased pain along the B (R > L) malleoli (lateral > medial). Pt. c/o her 'familiar' L knee pain along the ITB, most notably at the distal insertion.      Manual Therapy ([id number]):  30 minutes  Soft tissue mobilization: superficial effleurage, petrissage, cross-friction, & light pressure to the B gluteals, TFL, hip flexor, quadriceps, & ITB, & L gastroc/soleus, post tib, peroneals, & foot intrinsic mm.  Joint mobs: supine L TC distraction, L mid foot IR (grade III)    Appropriate force, direction, amplitude, & velocity for selected techniques to improve joint & soft tissue mobility & enhance ADL performance. Rationale & procedure given for above treatment techniques, & verbal consent was obtained prior to initiating treatment.       Dry Needling ([id number]):  5 minutes  Location (Needle length x Dosage):  - B TFL (75 mm x 1)  - B glute med (75 mm x 2)  - B glute max (75 mm x 2)  - B L4-5 (not performed today)    Type: basic insertion with needles left in situ + e-stim for mm. relaxation in B glute med   Response: no adverse events observed or reported    Pt. educated on theory & practical application of dry needling, as well as potential risks & benefits of needling, & additional verbal consent obtained prior to initiation of needling. Standard precautions, knowledge of appropriate benefits of treatment, & exclusion of relevant contraindications utilized.        Therapeutic Exercises ([id number]):  0 minutes  NuStep (level 4) x 15 min - performed IND  Walking on treadmill - not performed today    Verbally reviewed but not actively performed today:   Walking x 200'  B LE calf raises  Walking program  Standing gastroc & soleus stretch  Firm / SLS / Hip ABD (each)  Hook-lying hip ABD iso's into belt  Hook-lying bridge  Seated lumbar flexion  Seated piriformis stretch (each)  Hook-lying TA + PPT  Walking on treadmill @ 1.2 - 1.7 mph x 10  minutes  -  HR:  64 - 82 bpm (intermittent drops from low 80's to mid 60's observed)  -  SpO2:  97-98% on RA    * = added to HEP. Sheet with exercise descriptions and images issued. Skilled intervention utilized in the appropriate selection & application of above exercises. Verbal and tactile cues provided for proper form and technique. Pt. demonstrated appropriate form & verbalized understanding of optimal technique for above exercises.       Self Care / Home Management ([id number]):  19 minutes    Reviewed & updated current HEP:  B LE calf raises  Standing gastroc & soleus stretch  Firm / SLS / Hip ABD (each)  Hook-lying hip ABD iso's into belt  Seated piriformis stretch (each)  Hook-lying TA + PPT  Walking program    Large portion of pt. care time devoted to reviewing pt's current symptoms & determining optimal manual techniques with appropriate HEP modifications. Reviewed importance of regular HEP adherence, & stressed importance of proper form. Reviewed differences between post-exercises soreness vs. increased 'familiar’ pain, & reviewed appropriate progressions/regressions with exercises/activities based on symptoms.  Pt. verbalized understanding & agreement.

## 2024-08-08 ENCOUNTER — APPOINTMENT (OUTPATIENT)
Dept: PHYSICAL THERAPY | Facility: CLINIC | Age: 69
End: 2024-08-08
Payer: MEDICARE

## 2024-08-08 DIAGNOSIS — M25.572 CHRONIC PAIN OF BOTH ANKLES: ICD-10-CM

## 2024-08-08 DIAGNOSIS — G89.29 CHRONIC PAIN OF BOTH ANKLES: ICD-10-CM

## 2024-08-08 DIAGNOSIS — M25.561 CHRONIC PAIN OF BOTH KNEES: Primary | ICD-10-CM

## 2024-08-08 DIAGNOSIS — M17.0 ARTHRITIS OF BOTH KNEES: ICD-10-CM

## 2024-08-08 DIAGNOSIS — M25.571 CHRONIC PAIN OF BOTH ANKLES: ICD-10-CM

## 2024-08-08 DIAGNOSIS — G89.29 CHRONIC PAIN OF BOTH KNEES: Primary | ICD-10-CM

## 2024-08-08 DIAGNOSIS — M25.562 CHRONIC PAIN OF BOTH KNEES: Primary | ICD-10-CM

## 2024-08-08 DIAGNOSIS — M19.071 ARTHRITIS OF BOTH ANKLES: ICD-10-CM

## 2024-08-08 DIAGNOSIS — M19.072 ARTHRITIS OF BOTH ANKLES: ICD-10-CM

## 2024-08-08 PROCEDURE — 97535 SELF CARE MNGMENT TRAINING: CPT | Mod: GP

## 2024-08-08 PROCEDURE — 97140 MANUAL THERAPY 1/> REGIONS: CPT | Mod: GP
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
## 2024-08-13 ENCOUNTER — APPOINTMENT (OUTPATIENT)
Dept: PHYSICAL THERAPY | Facility: CLINIC | Age: 69
End: 2024-08-13
Payer: MEDICARE

## 2024-08-13 DIAGNOSIS — M19.072 ARTHRITIS OF BOTH ANKLES: ICD-10-CM

## 2024-08-13 DIAGNOSIS — G89.29 CHRONIC PAIN OF BOTH KNEES: Primary | ICD-10-CM

## 2024-08-13 DIAGNOSIS — M25.561 CHRONIC PAIN OF BOTH KNEES: Primary | ICD-10-CM

## 2024-08-13 DIAGNOSIS — M17.0 ARTHRITIS OF BOTH KNEES: ICD-10-CM

## 2024-08-13 DIAGNOSIS — M19.071 ARTHRITIS OF BOTH ANKLES: ICD-10-CM

## 2024-08-13 DIAGNOSIS — M25.572 CHRONIC PAIN OF BOTH ANKLES: ICD-10-CM

## 2024-08-13 DIAGNOSIS — M25.571 CHRONIC PAIN OF BOTH ANKLES: ICD-10-CM

## 2024-08-13 DIAGNOSIS — M25.562 CHRONIC PAIN OF BOTH KNEES: Primary | ICD-10-CM

## 2024-08-13 DIAGNOSIS — G89.29 CHRONIC PAIN OF BOTH ANKLES: ICD-10-CM

## 2024-08-13 PROCEDURE — 97140 MANUAL THERAPY 1/> REGIONS: CPT | Mod: GP,KX

## 2024-08-13 PROCEDURE — 97535 SELF CARE MNGMENT TRAINING: CPT | Mod: GP,KX

## 2024-08-13 NOTE — PROGRESS NOTES
Physical Therapy    Physical Therapy Treatment    Patient Name: Vielka Geiger  MRN: [id number]  Today's Date: 2024  Time Calculation  Start Time: 1437  Stop Time: 1517  Time Calculation (min): 40 min    Insurance   Visit number: 46 (1 of 6)   Medicare Re-Certification Period: Beginnin24 - 24      Assessment:  Overall the pt appears to be making slow, but steady progress toward her goals.  Her hip/knee/ankle pain are still limiting her, but she's generally seeing slow, but steady improvements.  However, pt would still benefit from weekly f/u's for the next 4-6 weeks in order to manage her transition to IND management.      Plan:  OP PT Plan  Treatment/Interventions: Dry needling, Education/ Instruction, Electrical stimulation, Gait training, Manual therapy, Neuromuscular re-education, Self care/ home management, Therapeutic activities, Therapeutic exercises  PT Plan: Skilled PT  PT Frequency: 1 time per week x 6 weeks  Certification Period Start Date: 24  Certification Period End Date: 24  Rehab Potential: Fair  Plan of Care Agreement: Patient      Current Problem  1. Chronic pain of both knees        2. Chronic pain of both ankles        3. Arthritis of both knees        4. Arthritis of both ankles              Subjective   Pt reports she's been trying to walk more, but it doesn't feel natural to her - states she's been compensating for so long, she doesn't know how to walk 'normally' anymore.        Objective   Observations:  pt. arrived to therapy ambulating with SPC in the R UE, & still demo'd decreased stance time on the L LE. Pt. was wearing B compression stockings (20-30 mmHg). Mod. edema, skin discoloration, & hardening noted B (R > L). Hypertonicity & tenderness also noted in the gastroc/soleus, peroneal, ant./post tibialis mm. Pt. also c/o increased pain along the B (R > L) malleoli (lateral > medial). Pt. c/o her 'familiar' L knee pain along the ITB, most notably at the  distal insertion.      Manual Therapy ([id number]):  24 minutes  Soft tissue mobilization: superficial effleurage, petrissage, cross-friction, & light pressure to the B gluteals, TFL, hip flexor, quadriceps, & ITB, & L gastroc/soleus, post tib, peroneals, & foot intrinsic mm.  Joint mobs: supine L TC distraction, L mid foot IR (grade III)    Appropriate force, direction, amplitude, & velocity for selected techniques to improve joint & soft tissue mobility & enhance ADL performance. Rationale & procedure given for above treatment techniques, & verbal consent was obtained prior to initiating treatment.       Dry Needling ([id number]):  6 minutes  Location (Needle length x Dosage):  - B TFL (75 mm x 1)  - B glute med (75 mm x 2)  - B glute max (75 mm x 2)  - B L4-5 (not performed today)    Type: basic insertion with needles left in situ + e-stim for mm. relaxation in B glute med   Response: no adverse events observed or reported    Pt. educated on theory & practical application of dry needling, as well as potential risks & benefits of needling, & additional verbal consent obtained prior to initiation of needling. Standard precautions, knowledge of appropriate benefits of treatment, & exclusion of relevant contraindications utilized.        Therapeutic Exercises ([id number]):  5 minutes  Walking on track x 3 laps  NuStep (level 4) x 15 min - performed IND    Verbally reviewed but not actively performed today:   Walking on treadmill   B LE calf raises  Walking program  Standing gastroc & soleus stretch  Firm / SLS / Hip ABD (each)  Hook-lying hip ABD iso's into belt  Hook-lying bridge  Seated lumbar flexion  Seated piriformis stretch (each)  Hook-lying TA + PPT  Walking on treadmill @ 1.2 - 1.7 mph x 10 minutes  -  HR:  64 - 82 bpm (intermittent drops from low 80's to mid 60's observed)  -  SpO2:  97-98% on RA    * = added to HEP. Sheet with exercise descriptions and images issued. Skilled intervention utilized in the appropriate  selection & application of above exercises. Verbal and tactile cues provided for proper form and technique. Pt. demonstrated appropriate form & verbalized understanding of optimal technique for above exercises.       Self Care / Home Management ([id number]):  10 minutes    Reviewed & updated current HEP:  B LE calf raises  Standing gastroc & soleus stretch  Firm / SLS / Hip ABD (each)  Hook-lying hip ABD iso's into belt  Seated piriformis stretch (each)  Hook-lying TA + PPT  Walking program    Large portion of pt. care time devoted to reviewing pt's current symptoms & determining optimal manual techniques with appropriate HEP modifications. Reviewed importance of regular HEP adherence, & stressed importance of proper form. Reviewed differences between post-exercises soreness vs. increased 'familiar’ pain, & reviewed appropriate progressions/regressions with exercises/activities based on symptoms.  Pt. verbalized understanding & agreement.

## 2024-08-15 ENCOUNTER — APPOINTMENT (OUTPATIENT)
Dept: PHYSICAL THERAPY | Facility: CLINIC | Age: 69
End: 2024-08-15
Payer: MEDICARE

## 2024-08-15 DIAGNOSIS — E04.1 NONTOXIC UNINODULAR GOITER: ICD-10-CM

## 2024-08-15 RX ORDER — LEVOTHYROXINE SODIUM 100 UG/1
TABLET ORAL
Qty: 90 TABLET | Refills: 3 | Status: SHIPPED | OUTPATIENT
Start: 2024-08-15

## 2024-08-20 ENCOUNTER — APPOINTMENT (OUTPATIENT)
Dept: PHYSICAL THERAPY | Facility: CLINIC | Age: 69
End: 2024-08-20
Payer: MEDICARE

## 2024-08-20 DIAGNOSIS — M17.0 ARTHRITIS OF BOTH KNEES: ICD-10-CM

## 2024-08-20 DIAGNOSIS — M19.072 ARTHRITIS OF BOTH ANKLES: ICD-10-CM

## 2024-08-20 DIAGNOSIS — M25.571 CHRONIC PAIN OF BOTH ANKLES: ICD-10-CM

## 2024-08-20 DIAGNOSIS — M25.561 CHRONIC PAIN OF BOTH KNEES: Primary | ICD-10-CM

## 2024-08-20 DIAGNOSIS — M19.071 ARTHRITIS OF BOTH ANKLES: ICD-10-CM

## 2024-08-20 DIAGNOSIS — G89.29 CHRONIC PAIN OF BOTH KNEES: Primary | ICD-10-CM

## 2024-08-20 DIAGNOSIS — M25.562 CHRONIC PAIN OF BOTH KNEES: Primary | ICD-10-CM

## 2024-08-20 DIAGNOSIS — G89.29 CHRONIC PAIN OF BOTH ANKLES: ICD-10-CM

## 2024-08-20 DIAGNOSIS — M25.572 CHRONIC PAIN OF BOTH ANKLES: ICD-10-CM

## 2024-08-20 PROCEDURE — 97140 MANUAL THERAPY 1/> REGIONS: CPT | Mod: GP

## 2024-08-20 PROCEDURE — 97535 SELF CARE MNGMENT TRAINING: CPT | Mod: GP

## 2024-08-20 NOTE — PROGRESS NOTES
Physical Therapy    Physical Therapy Treatment    Patient Name: Vielka Geiger  MRN: [id number]  Today's Date: 2024       Insurance   Visit number: 47 (2 of 6)   Medicare Re-Certification Period: Beginnin24 - 24      Assessment:  Overall the pt appears to be making slow, but steady progress toward her goals.  Her hip/knee/ankle pain are still limiting her, but she's generally seeing slow, but steady improvements.  However, pt would still benefit from weekly f/u's for the next 4-6 weeks in order to manage her transition to IND management.      Plan:  Pt to f/u 1 final time to reassess & update HEP, but will likely transition to IND management at that time.      Current Problem  1. Chronic pain of both knees        2. Chronic pain of both ankles        3. Arthritis of both knees        4. Arthritis of both ankles              Subjective   Pt reports she generally feeling okay - she went for a walk yesterday, but started having some L hip pain initially.  She also still has some L post tib pain but she's able to modify activity & manage it pretty well.  Pt reports the mid to low back pain will still aggravate with prolonged standing/walking.  Pt rates her B hip pain as 3-4/10 currently.        Objective   Observations:  pt. arrived to therapy ambulating with SPC in the R UE, & still demo'd decreased stance time on the L LE. Pt. was wearing B compression stockings (20-30 mmHg). Mod. edema, skin discoloration, & hardening noted B (R > L). Hypertonicity & tenderness also noted in the gastroc/soleus, peroneal, ant./post tibialis mm. Pt. also c/o increased pain along the B (R > L) malleoli (lateral > medial). Pt. c/o her 'familiar' L knee pain along the ITB, most notably at the distal insertion.      Manual Therapy ([id number]):  24 minutes  Soft tissue mobilization: superficial effleurage, petrissage, cross-friction, & light pressure to the B gluteals, TFL, hip flexor, quadriceps, & ITB, & L gastroc/soleus,  post tib, peroneals, & foot intrinsic mm.  Joint mobs: supine L TC distraction, L mid foot IR (grade III)    Appropriate force, direction, amplitude, & velocity for selected techniques to improve joint & soft tissue mobility & enhance ADL performance. Rationale & procedure given for above treatment techniques, & verbal consent was obtained prior to initiating treatment.       Dry Needling ([id number]):  6 minutes  Location (Needle length x Dosage):  - B TFL (75 mm x 1)  - B glute med (75 mm x 2)  - B glute max (75 mm x 2)    Type: basic insertion with needles left in situ + e-stim for mm. relaxation in B glute med   Response: no adverse events observed or reported    Pt. educated on theory & practical application of dry needling, as well as potential risks & benefits of needling, & additional verbal consent obtained prior to initiation of needling. Standard precautions, knowledge of appropriate benefits of treatment, & exclusion of relevant contraindications utilized.        Therapeutic Exercises ([id number]):  0 minutes  NuStep (level 4) x 15 min - performed IND    Verbally reviewed but not actively performed today:   Walking on treadmill   B LE calf raises  Walking program  Standing gastroc & soleus stretch  Firm / SLS / Hip ABD (each)  Hook-lying hip ABD iso's into belt  Hook-lying bridge  Seated lumbar flexion  Seated piriformis stretch (each)  Hook-lying TA + PPT  Walking on treadmill @ 1.2 - 1.7 mph x 10 minutes  -  HR:  64 - 82 bpm (intermittent drops from low 80's to mid 60's observed)  -  SpO2:  97-98% on RA    * = added to HEP. Sheet with exercise descriptions and images issued. Skilled intervention utilized in the appropriate selection & application of above exercises. Verbal and tactile cues provided for proper form and technique. Pt. demonstrated appropriate form & verbalized understanding of optimal technique for above exercises.       Self Care / Home Management ([id number]):  15 minutes    Reviewed & updated  current HEP:  B LE calf raises  Standing gastroc & soleus stretch  Firm / SLS / Hip ABD (each)  Hook-lying hip ABD iso's into belt  Seated piriformis stretch (each)  Hook-lying TA + PPT  Walking program    Large portion of pt. care time devoted to reviewing pt's current symptoms & determining optimal manual techniques with appropriate HEP modifications. Reviewed importance of regular HEP adherence, & stressed importance of proper form. Reviewed differences between post-exercises soreness vs. increased 'familiar’ pain, & reviewed appropriate progressions/regressions with exercises/activities based on symptoms.  Discussed POC & potential to transition pt to more IND management in next 1-2 f/u's.  Pt. verbalized understanding & agreement.

## 2024-08-22 ENCOUNTER — APPOINTMENT (OUTPATIENT)
Dept: PHYSICAL THERAPY | Facility: CLINIC | Age: 69
End: 2024-08-22
Payer: MEDICARE

## 2024-08-27 ENCOUNTER — APPOINTMENT (OUTPATIENT)
Dept: PHYSICAL THERAPY | Facility: CLINIC | Age: 69
End: 2024-08-27
Payer: MEDICARE

## 2024-08-29 ENCOUNTER — APPOINTMENT (OUTPATIENT)
Dept: PHYSICAL THERAPY | Facility: CLINIC | Age: 69
End: 2024-08-29
Payer: MEDICARE

## 2024-08-29 ENCOUNTER — TREATMENT (OUTPATIENT)
Dept: PHYSICAL THERAPY | Facility: CLINIC | Age: 69
End: 2024-08-29
Payer: MEDICARE

## 2024-08-29 DIAGNOSIS — M25.571 CHRONIC PAIN OF BOTH ANKLES: ICD-10-CM

## 2024-08-29 DIAGNOSIS — G89.29 CHRONIC PAIN OF BOTH ANKLES: ICD-10-CM

## 2024-08-29 DIAGNOSIS — M19.071 ARTHRITIS OF BOTH ANKLES: ICD-10-CM

## 2024-08-29 DIAGNOSIS — M25.562 CHRONIC PAIN OF BOTH KNEES: Primary | ICD-10-CM

## 2024-08-29 DIAGNOSIS — M25.572 CHRONIC PAIN OF BOTH ANKLES: ICD-10-CM

## 2024-08-29 DIAGNOSIS — M25.561 CHRONIC PAIN OF BOTH KNEES: Primary | ICD-10-CM

## 2024-08-29 DIAGNOSIS — G89.29 CHRONIC PAIN OF BOTH KNEES: Primary | ICD-10-CM

## 2024-08-29 DIAGNOSIS — M17.0 ARTHRITIS OF BOTH KNEES: ICD-10-CM

## 2024-08-29 DIAGNOSIS — M19.072 ARTHRITIS OF BOTH ANKLES: ICD-10-CM

## 2024-08-29 PROCEDURE — 97140 MANUAL THERAPY 1/> REGIONS: CPT | Mod: GP,KX

## 2024-08-29 PROCEDURE — 97535 SELF CARE MNGMENT TRAINING: CPT | Mod: GP,KX

## 2024-08-29 NOTE — PROGRESS NOTES
Physical Therapy    Physical Therapy Treatment    Patient Name: Vielka Geiger  MRN: [id number]  Today's Date: 2024  Time Calculation  Start Time: 1554  Stop Time: 1634  Time Calculation (min): 40 min    Insurance   Visit number: 48 (3 of 6)   Medicare Re-Certification Period: Beginnin24 - 24      Assessment:  Pt continues to make slow, but steady progress toward her goals.  Pt still has some increased pain with increased activity but it's generally improving.  At this time, the pt appears ready to transition to more IND management of symptoms & will only f/u in the clinic prn.      Plan:  D/C pt from care.  Pt. instructed to continue with HEP as prescribed & would only f/u in the clinic prn.         Current Problem  1. Chronic pain of both knees        2. Chronic pain of both ankles        3. Arthritis of both knees        4. Arthritis of both ankles              Subjective   Pt reports she was in the car for a while which caused some soreness, but she's generally been able to walk for longer prior to onset of pain.  Pt reports she's been having some burning in the R side when she's lying on the R over the past 3 days.      Objective   Observations:  pt. arrived to therapy ambulating with SPC in the R UE, & still demo'd decreased stance time on the L LE. Pt. was wearing B compression stockings (20-30 mmHg). Mod. edema, skin discoloration, & hardening noted B (R > L). Hypertonicity & tenderness also noted in the gastroc/soleus, peroneal, ant./post tibialis mm.       Manual Therapy ([id number]):  24 minutes  Soft tissue mobilization: superficial effleurage, petrissage, cross-friction, & light pressure to the B gluteals, TFL, hip flexor, quadriceps, & ITB, & L gastroc/soleus, post tib, peroneals, & foot intrinsic mm.  Joint mobs: supine L TC distraction, L mid foot IR (grade III)    Appropriate force, direction, amplitude, & velocity for selected techniques to improve joint & soft tissue mobility &  enhance ADL performance. Rationale & procedure given for above treatment techniques, & verbal consent was obtained prior to initiating treatment.       Dry Needling (15333):  6 minutes  Location (Needle length x Dosage):  - B TFL (75 mm x 1)  - B glute med (75 mm x 2)  - B glute max (75 mm x 2)    Type: basic insertion with needles left in situ + e-stim for mm. relaxation in B glute med   Response: no adverse events observed or reported    Pt. educated on theory & practical application of dry needling, as well as potential risks & benefits of needling, & additional verbal consent obtained prior to initiation of needling. Standard precautions, knowledge of appropriate benefits of treatment, & exclusion of relevant contraindications utilized.        Therapeutic Exercises (79703):  0 minutes  NuStep (level 4) x 15 min - performed IND    Verbally reviewed but not actively performed today:   Walking on treadmill   B LE calf raises  Walking program  Standing gastroc & soleus stretch  Firm / SLS / Hip ABD (each)  Hook-lying hip ABD iso's into belt  Hook-lying bridge  Seated lumbar flexion  Seated piriformis stretch (each)  Hook-lying TA + PPT  Walking on treadmill @ 1.2 - 1.7 mph x 10 minutes  -  HR:  64 - 82 bpm (intermittent drops from low 80's to mid 60's observed)  -  SpO2:  97-98% on RA    * = added to HEP. Sheet with exercise descriptions and images issued. Skilled intervention utilized in the appropriate selection & application of above exercises. Verbal and tactile cues provided for proper form and technique. Pt. demonstrated appropriate form & verbalized understanding of optimal technique for above exercises.       Self Care / Home Management ([id number]):  10 minutes    Reviewed & updated current HEP:  B LE calf raises  Standing gastroc & soleus stretch  Firm / SLS / Hip ABD (each)  Hook-lying hip ABD iso's into belt  Seated piriformis stretch (each)  Hook-lying TA + PPT  Walking program    Large portion of pt. care  time devoted to reviewing pt's current symptoms & determining optimal manual techniques with appropriate HEP modifications. Reviewed importance of regular HEP adherence, & stressed importance of proper form. Reviewed differences between post-exercises soreness vs. increased 'familiar’ pain, & reviewed appropriate progressions/regressions with exercises/activities based on symptoms.  Discussed POC & potential to transition to more IND management & pt. verbalized understanding & agreement.

## 2024-09-05 ENCOUNTER — APPOINTMENT (OUTPATIENT)
Dept: PHYSICAL THERAPY | Facility: CLINIC | Age: 69
End: 2024-09-05
Payer: MEDICARE

## 2024-09-12 ENCOUNTER — APPOINTMENT (OUTPATIENT)
Dept: PHYSICAL THERAPY | Facility: CLINIC | Age: 69
End: 2024-09-12
Payer: MEDICARE

## 2024-09-18 ENCOUNTER — APPOINTMENT (OUTPATIENT)
Dept: PRIMARY CARE | Facility: CLINIC | Age: 69
End: 2024-09-18
Payer: MEDICARE

## 2024-09-19 ENCOUNTER — APPOINTMENT (OUTPATIENT)
Dept: PHYSICAL THERAPY | Facility: CLINIC | Age: 69
End: 2024-09-19
Payer: MEDICARE

## 2024-09-26 ENCOUNTER — APPOINTMENT (OUTPATIENT)
Dept: PHYSICAL THERAPY | Facility: CLINIC | Age: 69
End: 2024-09-26
Payer: MEDICARE

## 2024-10-09 DIAGNOSIS — I10 BENIGN ESSENTIAL HTN: ICD-10-CM

## 2024-10-09 RX ORDER — FUROSEMIDE 40 MG/1
40 TABLET ORAL DAILY
Qty: 90 TABLET | Refills: 1 | Status: SHIPPED | OUTPATIENT
Start: 2024-10-09

## 2024-10-10 ENCOUNTER — TELEPHONE (OUTPATIENT)
Dept: PRIMARY CARE | Facility: CLINIC | Age: 69
End: 2024-10-10
Payer: MEDICARE

## 2024-10-10 NOTE — TELEPHONE ENCOUNTER
Patient was exposed to Whooping cough and Pneumonia and she has a deep congestion, and cough and her temperature is usually below 98.6 and she just took it and it is 98.6 now.  This has been going on for 24 hours.  She would like to be seen tomorrow.

## 2024-10-15 DIAGNOSIS — J01.90 ACUTE NON-RECURRENT SINUSITIS, UNSPECIFIED LOCATION: Primary | ICD-10-CM

## 2024-10-15 RX ORDER — AZITHROMYCIN 250 MG/1
TABLET, FILM COATED ORAL
Qty: 6 TABLET | Refills: 0 | Status: SHIPPED | OUTPATIENT
Start: 2024-10-15 | End: 2024-10-20

## 2024-10-15 NOTE — TELEPHONE ENCOUNTER
"Per pt, she thinks she has \"at least bronchitis, if not pneumonia\". She has tested negative for COVID yesterday. She has been having a productive cough since Thursday, dark phlegm, getting worse. She stated that she has no fever. She has body aches, some chills, fatigue. She has been taking Coricidin at night without improvement. She cannot sleep because of the coughing. She is asking for antibiotics, please advise. Pt is aware Dr Duran is not in today.   "

## 2024-11-07 DIAGNOSIS — E78.2 MIXED HYPERLIPIDEMIA: ICD-10-CM

## 2024-11-07 RX ORDER — SIMVASTATIN 40 MG/1
40 TABLET, FILM COATED ORAL NIGHTLY
Qty: 90 TABLET | Refills: 3 | Status: SHIPPED | OUTPATIENT
Start: 2024-11-07 | End: 2025-11-07

## 2024-11-07 NOTE — TELEPHONE ENCOUNTER
Patient is trying to get her Simvastatin filled by mail order, they may be calling to get approval, because she hasn't refilled it since January through them.

## 2024-11-20 ENCOUNTER — TELEPHONE (OUTPATIENT)
Dept: PRIMARY CARE | Facility: CLINIC | Age: 69
End: 2024-11-20
Payer: MEDICARE

## 2024-11-20 DIAGNOSIS — E03.9 HYPOTHYROIDISM, ADULT: ICD-10-CM

## 2024-11-20 DIAGNOSIS — Z00.00 HEALTHCARE MAINTENANCE: Primary | ICD-10-CM

## 2024-12-05 ENCOUNTER — LAB (OUTPATIENT)
Dept: LAB | Facility: LAB | Age: 69
End: 2024-12-05
Payer: MEDICARE

## 2024-12-05 DIAGNOSIS — E03.9 HYPOTHYROIDISM, ADULT: ICD-10-CM

## 2024-12-05 DIAGNOSIS — Z00.00 HEALTHCARE MAINTENANCE: ICD-10-CM

## 2024-12-05 LAB
ERYTHROCYTE [DISTWIDTH] IN BLOOD BY AUTOMATED COUNT: 13.5 % (ref 11.5–14.5)
HCT VFR BLD AUTO: 41.8 % (ref 36–46)
HGB BLD-MCNC: 13.6 G/DL (ref 12–16)
MCH RBC QN AUTO: 29.4 PG (ref 26–34)
MCHC RBC AUTO-ENTMCNC: 32.5 G/DL (ref 32–36)
MCV RBC AUTO: 90 FL (ref 80–100)
NRBC BLD-RTO: 0 /100 WBCS (ref 0–0)
PLATELET # BLD AUTO: 277 X10*3/UL (ref 150–450)
RBC # BLD AUTO: 4.63 X10*6/UL (ref 4–5.2)
WBC # BLD AUTO: 7.8 X10*3/UL (ref 4.4–11.3)

## 2024-12-05 PROCEDURE — 84443 ASSAY THYROID STIM HORMONE: CPT

## 2024-12-05 PROCEDURE — 80061 LIPID PANEL: CPT

## 2024-12-05 PROCEDURE — 85027 COMPLETE CBC AUTOMATED: CPT

## 2024-12-05 PROCEDURE — 36415 COLL VENOUS BLD VENIPUNCTURE: CPT

## 2024-12-05 PROCEDURE — 80053 COMPREHEN METABOLIC PANEL: CPT

## 2024-12-06 ENCOUNTER — APPOINTMENT (OUTPATIENT)
Dept: PRIMARY CARE | Facility: CLINIC | Age: 69
End: 2024-12-06
Payer: MEDICARE

## 2024-12-06 VITALS
HEIGHT: 68 IN | OXYGEN SATURATION: 98 % | SYSTOLIC BLOOD PRESSURE: 124 MMHG | TEMPERATURE: 98.7 F | DIASTOLIC BLOOD PRESSURE: 74 MMHG | HEART RATE: 86 BPM | WEIGHT: 282 LBS | BODY MASS INDEX: 42.74 KG/M2 | RESPIRATION RATE: 18 BRPM

## 2024-12-06 DIAGNOSIS — Z00.00 HEALTHCARE MAINTENANCE: Primary | ICD-10-CM

## 2024-12-06 DIAGNOSIS — Z23 NEED FOR INFLUENZA VACCINATION: ICD-10-CM

## 2024-12-06 DIAGNOSIS — Z00.00 ROUTINE GENERAL MEDICAL EXAMINATION AT HEALTH CARE FACILITY: ICD-10-CM

## 2024-12-06 LAB
ALBUMIN SERPL BCP-MCNC: 4.1 G/DL (ref 3.4–5)
ALP SERPL-CCNC: 66 U/L (ref 33–136)
ALT SERPL W P-5'-P-CCNC: 35 U/L (ref 7–45)
ANION GAP SERPL CALC-SCNC: 10 MMOL/L (ref 10–20)
AST SERPL W P-5'-P-CCNC: 23 U/L (ref 9–39)
BILIRUB SERPL-MCNC: 0.5 MG/DL (ref 0–1.2)
BUN SERPL-MCNC: 21 MG/DL (ref 6–23)
CALCIUM SERPL-MCNC: 9.4 MG/DL (ref 8.6–10.6)
CHLORIDE SERPL-SCNC: 104 MMOL/L (ref 98–107)
CHOLEST SERPL-MCNC: 198 MG/DL (ref 0–199)
CHOLESTEROL/HDL RATIO: 4.2
CO2 SERPL-SCNC: 30 MMOL/L (ref 21–32)
CREAT SERPL-MCNC: 1 MG/DL (ref 0.5–1.05)
EGFRCR SERPLBLD CKD-EPI 2021: 60 ML/MIN/1.73M*2
GLUCOSE SERPL-MCNC: 91 MG/DL (ref 74–99)
HDLC SERPL-MCNC: 47.3 MG/DL
LDLC SERPL CALC-MCNC: 108 MG/DL
NON HDL CHOLESTEROL: 151 MG/DL (ref 0–149)
POTASSIUM SERPL-SCNC: 4.4 MMOL/L (ref 3.5–5.3)
PROT SERPL-MCNC: 6.9 G/DL (ref 6.4–8.2)
SODIUM SERPL-SCNC: 140 MMOL/L (ref 136–145)
TRIGL SERPL-MCNC: 214 MG/DL (ref 0–149)
TSH SERPL-ACNC: 2.98 MIU/L (ref 0.44–3.98)
VLDL: 43 MG/DL (ref 0–40)

## 2024-12-06 ASSESSMENT — ENCOUNTER SYMPTOMS
CARDIOVASCULAR NEGATIVE: 1
EYES NEGATIVE: 1
CONSTITUTIONAL NEGATIVE: 1
DEPRESSION: 0
OCCASIONAL FEELINGS OF UNSTEADINESS: 0
LOSS OF SENSATION IN FEET: 0
RESPIRATORY NEGATIVE: 1
GASTROINTESTINAL NEGATIVE: 1
PSYCHIATRIC NEGATIVE: 1
ENDOCRINE NEGATIVE: 1
NEUROLOGICAL NEGATIVE: 1

## 2024-12-06 ASSESSMENT — PATIENT HEALTH QUESTIONNAIRE - PHQ9
1. LITTLE INTEREST OR PLEASURE IN DOING THINGS: NOT AT ALL
SUM OF ALL RESPONSES TO PHQ9 QUESTIONS 1 AND 2: 0
2. FEELING DOWN, DEPRESSED OR HOPELESS: NOT AT ALL

## 2024-12-06 ASSESSMENT — ACTIVITIES OF DAILY LIVING (ADL)
DRESSING: INDEPENDENT
MANAGING_FINANCES: INDEPENDENT
DOING_HOUSEWORK: INDEPENDENT
GROCERY_SHOPPING: INDEPENDENT
BATHING: INDEPENDENT
TAKING_MEDICATION: INDEPENDENT

## 2024-12-06 NOTE — PROGRESS NOTES
"  Subjective   Reason for Visit: Vielka Geiger is an 69 y.o. female here for a Medicare Wellness visit.          Reviewed all medications by prescribing practitioner or clinical pharmacist (such as prescriptions, OTCs, herbal therapies and supplements) and documented in the medical record.    HPI    Patient Care Team:  Lo Duran DO as PCP - General  Lo Duran DO as PCP - [facility] Attributed Provider     Review of Systems   Constitutional: Negative.    HENT: Negative.     Eyes: Negative.    Respiratory: Negative.     Cardiovascular: Negative.    Gastrointestinal: Negative.    Endocrine: Negative.    Genitourinary: Negative.    Skin: Negative.    Neurological: Negative.    Psychiatric/Behavioral: Negative.         Objective   Vitals:  /74 (BP Location: Right arm, Patient Position: Sitting)   Pulse 86   Temp 37.1 °C (98.7 °F)   Resp 18   Ht 1.727 m (5' 8\")   Wt 128 kg (282 lb)   SpO2 98%   BMI 42.88 kg/m²       Physical Exam  Constitutional:       General: She is not in acute distress.     Appearance: Normal appearance.   HENT:      Head: Normocephalic and atraumatic.      Right Ear: Tympanic membrane normal.      Left Ear: Tympanic membrane normal.      Nose: Nose normal.      Mouth/Throat:      Pharynx: Oropharynx is clear.   Eyes:      Conjunctiva/sclera: Conjunctivae normal.      Pupils: Pupils are equal, round, and reactive to light.   Neck:      Vascular: No carotid bruit.   Cardiovascular:      Rate and Rhythm: Normal rate and regular rhythm.      Heart sounds: Normal heart sounds.   Pulmonary:      Effort: Pulmonary effort is normal.      Breath sounds: Normal breath sounds.   Abdominal:      General: Bowel sounds are normal.      Palpations: Abdomen is soft.   Musculoskeletal:      Cervical back: Neck supple. No tenderness.   Skin:     General: Skin is warm and dry.   Neurological:      General: No focal deficit present.      Mental Status: She is alert.   Psychiatric:       "   Mood and Affect: Mood normal.         Behavior: Behavior normal.         Assessment & Plan  Healthcare maintenance    Orders:    ECG 12 lead (Clinic Performed)    Routine general medical examination at health care facility    Orders:    1 Year Follow Up In Primary Care - Wellness Exam; Future

## 2025-04-28 DIAGNOSIS — E78.2 MIXED HYPERLIPIDEMIA: Primary | ICD-10-CM

## 2025-04-28 RX ORDER — SIMVASTATIN 40 MG/1
40 TABLET, FILM COATED ORAL NIGHTLY
Qty: 90 TABLET | Refills: 3 | Status: SHIPPED | OUTPATIENT
Start: 2025-04-28 | End: 2026-04-28

## 2025-04-28 NOTE — TELEPHONE ENCOUNTER
Pt called asking if we could send a new rx to the [facility] that is confirmed by myself in her chart  She states that [facility] told her they do not have rx and she doesn't want to deal with them   Anymore   Rx was sent in Nov 2024 for 90days with 3rfs  Thank You  Randolph

## 2025-07-25 ENCOUNTER — TELEPHONE (OUTPATIENT)
Facility: CLINIC | Age: 70
End: 2025-07-25
Payer: MEDICARE

## 2025-07-25 NOTE — TELEPHONE ENCOUNTER
PT called and has a really bad sore throat. Congested some cough.  Does not feel like a cold. She did a COVID test and it was negative but the test was  by a month.  They just returned from [place] She will retest on  and call if it is positive so she can get paxlovid

## 2025-08-15 DIAGNOSIS — E04.1 NONTOXIC UNINODULAR GOITER: ICD-10-CM

## 2025-08-18 RX ORDER — LEVOTHYROXINE SODIUM 100 UG/1
TABLET ORAL
Qty: 90 TABLET | Refills: 3 | Status: SHIPPED | OUTPATIENT
Start: 2025-08-18

## 2025-12-10 ENCOUNTER — APPOINTMENT (OUTPATIENT)
Facility: CLINIC | Age: 70
End: 2025-12-10
Payer: MEDICARE